# Patient Record
Sex: FEMALE | Race: WHITE | NOT HISPANIC OR LATINO | Employment: PART TIME | URBAN - METROPOLITAN AREA
[De-identification: names, ages, dates, MRNs, and addresses within clinical notes are randomized per-mention and may not be internally consistent; named-entity substitution may affect disease eponyms.]

---

## 2023-09-29 PROBLEM — R53.83 FATIGUE: Status: ACTIVE | Noted: 2023-09-29

## 2023-09-29 RX ORDER — LEVONORGESTREL 19.5 MG/1
INTRAUTERINE DEVICE INTRAUTERINE
COMMUNITY
End: 2023-10-05 | Stop reason: WASHOUT

## 2023-10-05 ENCOUNTER — HOSPITAL ENCOUNTER (OUTPATIENT)
Dept: CARDIOLOGY | Facility: CLINIC | Age: 33
Discharge: HOME | End: 2023-10-05
Payer: COMMERCIAL

## 2023-10-05 ENCOUNTER — OFFICE VISIT (OUTPATIENT)
Dept: PRIMARY CARE | Facility: CLINIC | Age: 33
End: 2023-10-05
Payer: COMMERCIAL

## 2023-10-05 VITALS
HEIGHT: 70 IN | HEART RATE: 92 BPM | BODY MASS INDEX: 30.06 KG/M2 | DIASTOLIC BLOOD PRESSURE: 90 MMHG | WEIGHT: 210 LBS | OXYGEN SATURATION: 98 % | SYSTOLIC BLOOD PRESSURE: 138 MMHG

## 2023-10-05 DIAGNOSIS — R00.2 HEART PALPITATIONS: ICD-10-CM

## 2023-10-05 DIAGNOSIS — R00.2 PALPITATION: Primary | ICD-10-CM

## 2023-10-05 DIAGNOSIS — R00.2 PALPITATION: ICD-10-CM

## 2023-10-05 PROCEDURE — 1036F TOBACCO NON-USER: CPT | Performed by: INTERNAL MEDICINE

## 2023-10-05 PROCEDURE — 99214 OFFICE O/P EST MOD 30 MIN: CPT | Performed by: INTERNAL MEDICINE

## 2023-10-05 PROCEDURE — 93000 ELECTROCARDIOGRAM COMPLETE: CPT | Performed by: INTERNAL MEDICINE

## 2023-10-05 PROCEDURE — 93244 EXT ECG>48HR<7D REV&INTERPJ: CPT | Performed by: INTERNAL MEDICINE

## 2023-10-05 PROCEDURE — 93242 EXT ECG>48HR<7D RECORDING: CPT

## 2023-10-05 NOTE — PROGRESS NOTES
"Subjective   Patient ID: [id number]     Zulema Oliveira is a 33 y.o. female who presents for Establish Care and Hypertension.  No current outpatient medications on file.  HPI  33-year-old patient presented to the office today to discuss concerns regarding ongoing palpitations.  Patient stated that for the last 2 weeks she felt palpitations at night especially when she is watching TV or laying down.  1 episode made her experience shortness of breath as well.  Typically she has no chest pain or shortness of breath or difficulty breathing.  She is dealing with significant stress in her life lately and that could be contributing factor she drinks 2 cups of coffee per day.  Review of system was reviewed all normal except what is noted in HPI   No past medical history on file.   Objective   /90 (BP Location: Right arm, Patient Position: Sitting)   Pulse 92   Ht 1.765 m (5' 9.5\")   Wt 95.3 kg (210 lb)   SpO2 98%   BMI 30.57 kg/m²      Physical Exam  Constitutional:       Appearance: Normal appearance.   Cardiovascular:      Rate and Rhythm: Normal rate and regular rhythm.      Pulses: Normal pulses.      Heart sounds: Normal heart sounds.   Pulmonary:      Effort: Pulmonary effort is normal.      Breath sounds: Normal breath sounds.   Neurological:      Mental Status: She is alert.       Assessment/Plan   Problem List Items Addressed This Visit    None  Visit Diagnoses       Heart palpitations            53-year-old patient with the following issues.    1.  Concerns regarding heart palpitations, I believe at this point we need to proceed with cardiac monitor to capture these episodes reviewed time and analyzed the cardiac rhythm.  Most likely they are premature contractions but believes that when he received them.  I will see the patient back in the office to discuss the results.  Meanwhile I instructed the patient to make sure she is hydrated and to take a deep breath and very slowly when these happen to try to " imitate them we are going to check her thyroid function and hemoglobin since she has not had any blood work recently.  Her blood pressure seems to be trending on the higher side but I believe it is all related to anxiety.  We will monitor very closely and we will treat if needed.  Patient stated understanding all her questions were addressed no other active issues or concerns.    Shamika Bob MD

## 2023-10-05 NOTE — PROGRESS NOTES
Subjective   Patient ID: Zulema Oliveira is a 33 y.o. female who presents for Establish Care and Hypertension.    HPI   Patient presents today to establish care.     Patient also states that she has been having heart palpitation and elevated blood pressure readings. Patient states that she did check her blood pressure at home and it was 148/98 and then recheck it and it was 138/80's.   Denies dizziness, but states she gets headaches just about every night.     Hypertension does run in the family     Did have preeclampsia when she had her son last June.    Review of Systems    Objective   There were no vitals taken for this visit.    Physical Exam    Assessment/Plan   Problem List Items Addressed This Visit    None

## 2023-10-06 ENCOUNTER — LAB (OUTPATIENT)
Dept: LAB | Facility: LAB | Age: 33
End: 2023-10-06
Payer: COMMERCIAL

## 2023-10-06 DIAGNOSIS — R00.2 HEART PALPITATIONS: ICD-10-CM

## 2023-10-06 LAB
ERYTHROCYTE [DISTWIDTH] IN BLOOD BY AUTOMATED COUNT: 11.9 % (ref 11.5–14.5)
HCT VFR BLD AUTO: 42.5 % (ref 36–46)
HGB BLD-MCNC: 14 G/DL (ref 12–16)
MCH RBC QN AUTO: 30.9 PG (ref 26–34)
MCHC RBC AUTO-ENTMCNC: 32.9 G/DL (ref 32–36)
MCV RBC AUTO: 94 FL (ref 80–100)
NRBC BLD-RTO: 0 /100 WBCS (ref 0–0)
PLATELET # BLD AUTO: 215 X10*3/UL (ref 150–450)
PMV BLD AUTO: 12.4 FL (ref 7.5–11.5)
RBC # BLD AUTO: 4.53 X10*6/UL (ref 4–5.2)
TSH SERPL-ACNC: 1.6 MIU/L (ref 0.44–3.98)
WBC # BLD AUTO: 5.5 X10*3/UL (ref 4.4–11.3)

## 2023-10-06 PROCEDURE — 85027 COMPLETE CBC AUTOMATED: CPT

## 2023-10-06 PROCEDURE — 36415 COLL VENOUS BLD VENIPUNCTURE: CPT

## 2023-10-06 PROCEDURE — 84443 ASSAY THYROID STIM HORMONE: CPT

## 2023-10-10 ENCOUNTER — TELEPHONE (OUTPATIENT)
Dept: PRIMARY CARE | Facility: CLINIC | Age: 33
End: 2023-10-10
Payer: COMMERCIAL

## 2023-11-02 DIAGNOSIS — I47.20 VENTRICULAR TACHYCARDIA SEEN ON CARDIAC MONITOR (MULTI): ICD-10-CM

## 2023-11-02 DIAGNOSIS — R00.2 PALPITATIONS: Primary | ICD-10-CM

## 2023-11-07 ENCOUNTER — APPOINTMENT (OUTPATIENT)
Dept: PRIMARY CARE | Facility: CLINIC | Age: 33
End: 2023-11-07
Payer: COMMERCIAL

## 2023-12-06 ENCOUNTER — APPOINTMENT (OUTPATIENT)
Dept: CARDIOLOGY | Facility: CLINIC | Age: 33
End: 2023-12-06
Payer: COMMERCIAL

## 2023-12-15 ENCOUNTER — APPOINTMENT (OUTPATIENT)
Dept: PRIMARY CARE | Facility: CLINIC | Age: 33
End: 2023-12-15
Payer: COMMERCIAL

## 2023-12-15 ENCOUNTER — OFFICE VISIT (OUTPATIENT)
Dept: PRIMARY CARE | Facility: CLINIC | Age: 33
End: 2023-12-15
Payer: COMMERCIAL

## 2023-12-15 VITALS
WEIGHT: 213.6 LBS | DIASTOLIC BLOOD PRESSURE: 80 MMHG | SYSTOLIC BLOOD PRESSURE: 122 MMHG | HEART RATE: 78 BPM | OXYGEN SATURATION: 100 % | BODY MASS INDEX: 30.58 KG/M2 | HEIGHT: 70 IN

## 2023-12-15 DIAGNOSIS — Z00.00 HEALTH CARE MAINTENANCE: Primary | ICD-10-CM

## 2023-12-15 DIAGNOSIS — R00.2 HEART PALPITATIONS: ICD-10-CM

## 2023-12-15 PROCEDURE — 99395 PREV VISIT EST AGE 18-39: CPT | Performed by: INTERNAL MEDICINE

## 2023-12-15 PROCEDURE — 1036F TOBACCO NON-USER: CPT | Performed by: INTERNAL MEDICINE

## 2023-12-15 ASSESSMENT — PATIENT HEALTH QUESTIONNAIRE - PHQ9
2. FEELING DOWN, DEPRESSED OR HOPELESS: NOT AT ALL
1. LITTLE INTEREST OR PLEASURE IN DOING THINGS: NOT AT ALL
SUM OF ALL RESPONSES TO PHQ9 QUESTIONS 1 AND 2: 0

## 2023-12-15 NOTE — PROGRESS NOTES
"Reason for Visit: Annual Physical Exam  Allergies and Medications  Patient has no known allergies.  Current Outpatient Medications   Medication Instructions    prenatal vit 75/iron/folic/om3 (ONE DAILY PRENATAL ORAL) oral     HPI:  33-year-old patient presented to the office for her annual exam.  Patient is doing really well and she told me today that she just found out she 6 weeks pregnant.  She denies any chest pain and shortness of breath not even on exertion she was recently evaluated with a cardiac monitor for palpitations she does have an appointment coming up with cardiology in 5 days.  She is not having as much palpitations as she used to.  She denies any abdominal pain nausea vomiting changes in the bowel movements or pain.  She denies urine symptoms.  Her appetite is good her energy level is adequate.    ROS otherwise negative aside from what was mentioned above in HPI.    Vitals  /80 (BP Location: Right arm, Patient Position: Sitting)   Pulse 78   Ht 1.765 m (5' 9.5\")   Wt 96.9 kg (213 lb 9.6 oz)   SpO2 100%   BMI 31.09 kg/m²   Body mass index is 31.09 kg/m².  Physical Exam  Physical Exam  Constitutional:       Appearance: Normal appearance.   Eyes:      Extraocular Movements: Extraocular movements intact.      Pupils: Pupils are equal, round, and reactive to light.   Cardiovascular:      Rate and Rhythm: Normal rate and regular rhythm.      Pulses: Normal pulses.      Heart sounds: Normal heart sounds.   Pulmonary:      Effort: Pulmonary effort is normal.      Breath sounds: Normal breath sounds.   Chest:      Comments: Breast exam was completed no masses asymmetry or discharge.  Abdominal:      General: Abdomen is flat.      Palpations: Abdomen is soft.   Neurological:      General: No focal deficit present.      Mental Status: She is alert. Mental status is at baseline.   Psychiatric:         Mood and Affect: Mood normal.         Behavior: Behavior normal.         Thought Content: Thought " content normal.         Judgment: Judgment normal.        Active Problem List    Comprehensive Medical/Surgical/Social/Family History  No past medical history on file.  Past Surgical History:   Procedure Laterality Date    OTHER SURGICAL HISTORY  07/13/2020    No history of surgery     Social History     Social History Narrative    Not on file   Patient does not smoke and does not drink alcohol she drinks 1 cup of coffee a day she works as a nurse educator part-time.  Family History   Problem Relation Name Age of Onset    Anemia Mother      Hypothyroidism Mother      Hypertension Father      Atrial fibrillation Father          by ECG    Breast cancer Paternal Grandmother      Lung cancer Paternal Grandfather        Patient has 1 brother in good health.    Assessment and Plan:  Problem List Items Addressed This Visit    None  53-year-old patient with the following issues.    1.  Palpitations status post evaluation with a cardiac monitor with results of preliminary available in the chart they have not yet been read by cardiologist patient is scheduled with cardiology in 5 days and they will go over the results.  Her rhythm is sinus and there is less than 1% burden of ventricular ectopy which I do not believe is concerning.    2.  Health maintenance issues, lab work is requested patient is up-to-date on her Pap smear.    Disposition, I will see the patient back in the office in 1 year for repeat physical and sooner if needed.

## 2023-12-18 ENCOUNTER — LAB (OUTPATIENT)
Dept: LAB | Facility: LAB | Age: 33
End: 2023-12-18
Payer: COMMERCIAL

## 2023-12-18 ENCOUNTER — INITIAL PRENATAL (OUTPATIENT)
Dept: OBSTETRICS AND GYNECOLOGY | Facility: CLINIC | Age: 33
End: 2023-12-18
Payer: COMMERCIAL

## 2023-12-18 VITALS — BODY MASS INDEX: 31.09 KG/M2 | WEIGHT: 213.63 LBS

## 2023-12-18 DIAGNOSIS — Z34.81 ENCOUNTER FOR SUPERVISION OF OTHER NORMAL PREGNANCY IN FIRST TRIMESTER (HHS-HCC): ICD-10-CM

## 2023-12-18 DIAGNOSIS — Z34.90 PRENATAL CARE, ANTEPARTUM (HHS-HCC): ICD-10-CM

## 2023-12-18 DIAGNOSIS — Z32.01 PREGNANCY TEST POSITIVE (HHS-HCC): ICD-10-CM

## 2023-12-18 DIAGNOSIS — Z87.59 HISTORY OF PRE-ECLAMPSIA: ICD-10-CM

## 2023-12-18 DIAGNOSIS — R10.9 TRIGGER POINT OF ABDOMEN: ICD-10-CM

## 2023-12-18 DIAGNOSIS — R00.2 PALPITATIONS: Primary | ICD-10-CM

## 2023-12-18 PROBLEM — Z34.91 ENCOUNTER FOR SUPERVISION OF NORMAL PREGNANCY IN FIRST TRIMESTER (HHS-HCC): Status: ACTIVE | Noted: 2023-12-18

## 2023-12-18 LAB
ABO GROUP (TYPE) IN BLOOD: NORMAL
ANTIBODY SCREEN: NORMAL
CREAT UR-MCNC: 64.2 MG/DL (ref 20–320)
ERYTHROCYTE [DISTWIDTH] IN BLOOD BY AUTOMATED COUNT: 11.7 % (ref 11.5–14.5)
HBV SURFACE AG SERPL QL IA: NONREACTIVE
HCT VFR BLD AUTO: 38.6 % (ref 36–46)
HCV AB SER QL: NONREACTIVE
HGB BLD-MCNC: 13.3 G/DL (ref 12–16)
HIV 1+2 AB+HIV1 P24 AG SERPL QL IA: NONREACTIVE
MCH RBC QN AUTO: 32.2 PG (ref 26–34)
MCHC RBC AUTO-ENTMCNC: 34.5 G/DL (ref 32–36)
MCV RBC AUTO: 94 FL (ref 80–100)
NRBC BLD-RTO: 0 /100 WBCS (ref 0–0)
PLATELET # BLD AUTO: 188 X10*3/UL (ref 150–450)
PREGNANCY TEST URINE, POC: POSITIVE
PROT UR-ACNC: 4 MG/DL (ref 5–24)
PROT/CREAT UR: 0.06 MG/MG CREAT (ref 0–0.17)
RBC # BLD AUTO: 4.13 X10*6/UL (ref 4–5.2)
REFLEX ADDED, ANEMIA PANEL: NORMAL
RH FACTOR (ANTIGEN D): NORMAL
RUBV IGG SERPL IA-ACNC: 1 IA
RUBV IGG SERPL QL IA: POSITIVE
T PALLIDUM AB SER QL: NONREACTIVE
URATE SERPL-MCNC: 5 MG/DL (ref 2.3–6.7)
WBC # BLD AUTO: 9.3 X10*3/UL (ref 4.4–11.3)

## 2023-12-18 PROCEDURE — 81025 URINE PREGNANCY TEST: CPT | Performed by: STUDENT IN AN ORGANIZED HEALTH CARE EDUCATION/TRAINING PROGRAM

## 2023-12-18 PROCEDURE — 86850 RBC ANTIBODY SCREEN: CPT

## 2023-12-18 PROCEDURE — 87086 URINE CULTURE/COLONY COUNT: CPT

## 2023-12-18 PROCEDURE — 86803 HEPATITIS C AB TEST: CPT

## 2023-12-18 PROCEDURE — 86780 TREPONEMA PALLIDUM: CPT

## 2023-12-18 PROCEDURE — 86317 IMMUNOASSAY INFECTIOUS AGENT: CPT

## 2023-12-18 PROCEDURE — 87389 HIV-1 AG W/HIV-1&-2 AB AG IA: CPT

## 2023-12-18 PROCEDURE — 84550 ASSAY OF BLOOD/URIC ACID: CPT

## 2023-12-18 PROCEDURE — 87800 DETECT AGNT MULT DNA DIREC: CPT

## 2023-12-18 PROCEDURE — 83020 HEMOGLOBIN ELECTROPHORESIS: CPT | Performed by: STUDENT IN AN ORGANIZED HEALTH CARE EDUCATION/TRAINING PROGRAM

## 2023-12-18 PROCEDURE — 36415 COLL VENOUS BLD VENIPUNCTURE: CPT

## 2023-12-18 PROCEDURE — 84156 ASSAY OF PROTEIN URINE: CPT

## 2023-12-18 PROCEDURE — 82570 ASSAY OF URINE CREATININE: CPT

## 2023-12-18 PROCEDURE — 0500F INITIAL PRENATAL CARE VISIT: CPT | Performed by: STUDENT IN AN ORGANIZED HEALTH CARE EDUCATION/TRAINING PROGRAM

## 2023-12-18 PROCEDURE — 86901 BLOOD TYPING SEROLOGIC RH(D): CPT

## 2023-12-18 PROCEDURE — 86900 BLOOD TYPING SEROLOGIC ABO: CPT

## 2023-12-18 PROCEDURE — 85027 COMPLETE CBC AUTOMATED: CPT

## 2023-12-18 PROCEDURE — 83021 HEMOGLOBIN CHROMOTOGRAPHY: CPT

## 2023-12-18 PROCEDURE — 87340 HEPATITIS B SURFACE AG IA: CPT

## 2023-12-18 ASSESSMENT — EDINBURGH POSTNATAL DEPRESSION SCALE (EPDS)
I HAVE FELT SCARED OR PANICKY FOR NO GOOD REASON: YES, SOMETIMES
I HAVE BEEN SO UNHAPPY THAT I HAVE BEEN CRYING: NO, NEVER
I HAVE LOOKED FORWARD WITH ENJOYMENT TO THINGS: AS MUCH AS I EVER DID
I HAVE BEEN SO UNHAPPY THAT I HAVE HAD DIFFICULTY SLEEPING: NOT AT ALL
I HAVE FELT SAD OR MISERABLE: NO, NOT AT ALL
THE THOUGHT OF HARMING MYSELF HAS OCCURRED TO ME: NEVER
TOTAL SCORE: 7
THINGS HAVE BEEN GETTING ON TOP OF ME: NO, MOST OF THE TIME I HAVE COPED QUITE WELL
I HAVE BLAMED MYSELF UNNECESSARILY WHEN THINGS WENT WRONG: YES, SOME OF THE TIME
I HAVE BEEN ABLE TO LAUGH AND SEE THE FUNNY SIDE OF THINGS: AS MUCH AS I ALWAYS COULD
I HAVE BEEN ANXIOUS OR WORRIED FOR NO GOOD REASON: YES, SOMETIMES

## 2023-12-19 LAB
BACTERIA UR CULT: NO GROWTH
C TRACH RRNA SPEC QL NAA+PROBE: NEGATIVE
HEMOGLOBIN A2: 3.1 % (ref 2–3.5)
HEMOGLOBIN A: 95.6 % (ref 95.8–98)
HEMOGLOBIN F: 1.3 % (ref 0–2)
HEMOGLOBIN IDENTIFICATION INTERPRETATION: NORMAL
N GONORRHOEA DNA SPEC QL PROBE+SIG AMP: NEGATIVE
PATH REVIEW-HGB IDENTIFICATION: ABNORMAL

## 2023-12-20 ENCOUNTER — OFFICE VISIT (OUTPATIENT)
Dept: CARDIOLOGY | Facility: CLINIC | Age: 33
End: 2023-12-20
Payer: COMMERCIAL

## 2023-12-20 VITALS
HEART RATE: 81 BPM | SYSTOLIC BLOOD PRESSURE: 118 MMHG | WEIGHT: 216 LBS | DIASTOLIC BLOOD PRESSURE: 79 MMHG | OXYGEN SATURATION: 98 % | HEIGHT: 70 IN | BODY MASS INDEX: 30.92 KG/M2

## 2023-12-20 DIAGNOSIS — R00.2 PALPITATIONS: Primary | ICD-10-CM

## 2023-12-20 DIAGNOSIS — R06.02 SHORTNESS OF BREATH: ICD-10-CM

## 2023-12-20 PROCEDURE — 99213 OFFICE O/P EST LOW 20 MIN: CPT | Mod: 25 | Performed by: INTERNAL MEDICINE

## 2023-12-20 PROCEDURE — 1036F TOBACCO NON-USER: CPT | Performed by: INTERNAL MEDICINE

## 2023-12-20 PROCEDURE — 93005 ELECTROCARDIOGRAM TRACING: CPT | Performed by: INTERNAL MEDICINE

## 2023-12-20 PROCEDURE — 93010 ELECTROCARDIOGRAM REPORT: CPT | Performed by: INTERNAL MEDICINE

## 2023-12-20 PROCEDURE — 99203 OFFICE O/P NEW LOW 30 MIN: CPT | Performed by: INTERNAL MEDICINE

## 2023-12-20 ASSESSMENT — COLUMBIA-SUICIDE SEVERITY RATING SCALE - C-SSRS
6. HAVE YOU EVER DONE ANYTHING, STARTED TO DO ANYTHING, OR PREPARED TO DO ANYTHING TO END YOUR LIFE?: NO
1. IN THE PAST MONTH, HAVE YOU WISHED YOU WERE DEAD OR WISHED YOU COULD GO TO SLEEP AND NOT WAKE UP?: NO
2. HAVE YOU ACTUALLY HAD ANY THOUGHTS OF KILLING YOURSELF?: NO

## 2023-12-20 ASSESSMENT — PATIENT HEALTH QUESTIONNAIRE - PHQ9
SUM OF ALL RESPONSES TO PHQ9 QUESTIONS 1 AND 2: 0
1. LITTLE INTEREST OR PLEASURE IN DOING THINGS: NOT AT ALL
2. FEELING DOWN, DEPRESSED OR HOPELESS: NOT AT ALL

## 2023-12-20 ASSESSMENT — PAIN SCALES - GENERAL: PAINLEVEL: 0-NO PAIN

## 2023-12-20 NOTE — PROGRESS NOTES
"Subjective   Zulema Oliveira is a 33 y.o. female who presents to the [facility] for evaluation of shortness of breath and palpitations.     Ms. Oliveira notes episodes of palpitations in summer and fall with feeling of \"skipped heart beat feeling\". 7 day HR monitor in October showed average HR 80 bpm (44 -158 bpm range) with rare PACs/PVCs and no abnormal tachyarrhythmia. She is now 7 weeks pregnant and has noted new shortness of breath at rest and with activity x 1 week. No active  cardiac symptoms of chest pain, PND, orthopnea, JASMYNE, syncope, or claudication.     Past Medical History:  1. Palpitations  2. Pre-eclampsia    Social History:  Nonsmoker    Family History:  No CAD in 1st degree relatives. Father has dyslipidemia and AFib.    Review of Systems    A 14 point review of systems was asked. All questions were negative except for pertinent positives listed in the HPI.     Current Outpatient Medications on File Prior to Visit   Medication Sig Dispense Refill    prenatal vit 75/iron/folic/om3 (ONE DAILY PRENATAL ORAL) Take by mouth.       No current facility-administered medications on file prior to visit.          Objective   Physical Exam  BP Readings from Last 3 Encounters:   12/20/23 118/79   12/15/23 122/80   10/05/23 138/90      Wt Readings from Last 3 Encounters:   12/20/23 98 kg (216 lb)   12/18/23 96.9 kg (213 lb 10 oz)   12/15/23 96.9 kg (213 lb 9.6 oz)      BMI: Estimated body mass index is 30.99 kg/m² as calculated from the following:    Height as of this encounter: 1.778 m (5' 10\").    Weight as of this encounter: 98 kg (216 lb).  BSA: Estimated body surface area is 2.2 meters squared as calculated from the following:    Height as of this encounter: 1.778 m (5' 10\").    Weight as of this encounter: 98 kg (216 lb).    General: no acute distress  HEENT: EOMI, no scleral icterus.  Lungs: Clear to auscultation bilaterally without wheezing, rales, or rhonchi.  Cardiovascular: " "Regular rhythm and rate. Normal S1 and S2. No murmurs, rubs, or gallops are appreciated. JVP normal.  Abdomen: Soft, nontender, nondistended. Bowel sounds present.  Extremities: Warm and well perfused with equal 2+ pulses bilaterally.  No edema present.  Neurologic: Alert and oriented x3.    I have personally reviewed the following images and laboratory findings:  Last echocardiogram: n/a    Last cath / stress test: n/a    Most recent EC2023 ECG: Sinus rhythm with sinus arrhythmia, 73 bpm. Normal ECG.    10/5/2023 7 day heart rate monitor: Average HR 80 bpm (range 44 - 158 bpm), rare PAC/PVCs, no abnormal tachyarrhythmia, no AFib/AFlutter     Lab Results   Component Value Date    CHOL 118 2023    CHOL 124 2020     Lab Results   Component Value Date    HDL 52.5 2023    HDL 57.4 2020     Lab Results   Component Value Date    LDLCALC 54 2023     Lab Results   Component Value Date    TRIG 58 2023    TRIG 55 2020     No components found for: \"CHOLHDL\"      Assessment/Plan   1. Dyspnea:  Check echocardiogram. History of pre-eclampsia with 1st pregnancy and rare PVCs on 2023 heart rate monitor.    2. Palpitations:  Trigger avoidance. Symptomatology can be exacerbated in 2nd/3rd trimeter of pregnancy. Maintain oral hydration. No abnormal heart rhythms on 2023 7 day heart rate monitor. Would not advise use of AV stella medications to suppress ectopic beats as she is currently pregnant.           SIGNATURE: Kennedy Rios MD PATIENT NAME: Zulema Oliveira   DATE/TIME: 2023 4:32 PM MRN: [id number]     "

## 2023-12-20 NOTE — PATIENT INSTRUCTIONS
You were seen at the [facility] for a check up of your heart.    We reviewed your October 2023 heart rate monitor. Average heart rate 80 beats per minute with normal heart rate variability (range 44 -158 bpm). You had rare ectopic contractions (extra heart beats) from the top (PACs) and bottom (PVCs) of your heart. No abnormal fasting heart rhythms were seen.    Your ECG today is normal. Your heart exam today is normal.    For your new shortness of breath with history of pre-eclampsia, I am ordering an echocardiogram (ultrasound of the heart) to look at your heart's structure and function.

## 2023-12-21 ENCOUNTER — LAB (OUTPATIENT)
Dept: LAB | Facility: LAB | Age: 33
End: 2023-12-21
Payer: COMMERCIAL

## 2023-12-21 DIAGNOSIS — Z00.00 HEALTH CARE MAINTENANCE: ICD-10-CM

## 2023-12-21 LAB
ALBUMIN SERPL BCP-MCNC: 4.1 G/DL (ref 3.4–5)
ALP SERPL-CCNC: 17 U/L (ref 33–110)
ALT SERPL W P-5'-P-CCNC: 11 U/L (ref 7–45)
ANION GAP SERPL CALC-SCNC: 13 MMOL/L (ref 10–20)
AST SERPL W P-5'-P-CCNC: 12 U/L (ref 9–39)
ATRIAL RATE: 73 BPM
BILIRUB SERPL-MCNC: 0.5 MG/DL (ref 0–1.2)
BUN SERPL-MCNC: 11 MG/DL (ref 6–23)
CALCIUM SERPL-MCNC: 9 MG/DL (ref 8.6–10.6)
CHLORIDE SERPL-SCNC: 108 MMOL/L (ref 98–107)
CHOLEST SERPL-MCNC: 118 MG/DL (ref 0–199)
CHOLESTEROL/HDL RATIO: 2.2
CO2 SERPL-SCNC: 23 MMOL/L (ref 21–32)
CREAT SERPL-MCNC: 0.53 MG/DL (ref 0.5–1.05)
ERYTHROCYTE [DISTWIDTH] IN BLOOD BY AUTOMATED COUNT: 11.9 % (ref 11.5–14.5)
GFR SERPL CREATININE-BSD FRML MDRD: >90 ML/MIN/1.73M*2
GLUCOSE SERPL-MCNC: 85 MG/DL (ref 74–99)
HCT VFR BLD AUTO: 38 % (ref 36–46)
HDLC SERPL-MCNC: 52.5 MG/DL
HGB BLD-MCNC: 13.1 G/DL (ref 12–16)
LDLC SERPL CALC-MCNC: 54 MG/DL
MCH RBC QN AUTO: 32.2 PG (ref 26–34)
MCHC RBC AUTO-ENTMCNC: 34.5 G/DL (ref 32–36)
MCV RBC AUTO: 93 FL (ref 80–100)
NON HDL CHOLESTEROL: 66 MG/DL (ref 0–149)
NRBC BLD-RTO: 0 /100 WBCS (ref 0–0)
P AXIS: -9 DEGREES
P OFFSET: 203 MS
P ONSET: 150 MS
PLATELET # BLD AUTO: 180 X10*3/UL (ref 150–450)
POTASSIUM SERPL-SCNC: 4 MMOL/L (ref 3.5–5.3)
PR INTERVAL: 140 MS
PROT SERPL-MCNC: 6.5 G/DL (ref 6.4–8.2)
Q ONSET: 220 MS
QRS COUNT: 12 BEATS
QRS DURATION: 92 MS
QT INTERVAL: 404 MS
QTC CALCULATION(BAZETT): 445 MS
QTC FREDERICIA: 431 MS
R AXIS: 16 DEGREES
RBC # BLD AUTO: 4.07 X10*6/UL (ref 4–5.2)
SODIUM SERPL-SCNC: 140 MMOL/L (ref 136–145)
T AXIS: 24 DEGREES
T OFFSET: 422 MS
TRIGL SERPL-MCNC: 58 MG/DL (ref 0–149)
TSH SERPL-ACNC: 2.2 MIU/L (ref 0.44–3.98)
VENTRICULAR RATE: 73 BPM
VLDL: 12 MG/DL (ref 0–40)
WBC # BLD AUTO: 6.5 X10*3/UL (ref 4.4–11.3)

## 2023-12-21 PROCEDURE — 84443 ASSAY THYROID STIM HORMONE: CPT

## 2023-12-21 PROCEDURE — 80061 LIPID PANEL: CPT

## 2023-12-21 PROCEDURE — 80053 COMPREHEN METABOLIC PANEL: CPT

## 2023-12-21 PROCEDURE — 36415 COLL VENOUS BLD VENIPUNCTURE: CPT

## 2023-12-21 PROCEDURE — 85027 COMPLETE CBC AUTOMATED: CPT

## 2024-01-06 LAB — BODY SURFACE AREA: 2.16 M2

## 2024-01-09 ENCOUNTER — TELEPHONE (OUTPATIENT)
Dept: PRIMARY CARE | Facility: CLINIC | Age: 34
End: 2024-01-09
Payer: COMMERCIAL

## 2024-01-09 NOTE — TELEPHONE ENCOUNTER
Patient received a message yesterday, that Cardiology results are in. Please call patient back to advise of results.

## 2024-01-15 ENCOUNTER — ROUTINE PRENATAL (OUTPATIENT)
Dept: OBSTETRICS AND GYNECOLOGY | Facility: CLINIC | Age: 34
End: 2024-01-15
Payer: COMMERCIAL

## 2024-01-15 VITALS — WEIGHT: 217 LBS | DIASTOLIC BLOOD PRESSURE: 66 MMHG | BODY MASS INDEX: 31.14 KG/M2 | SYSTOLIC BLOOD PRESSURE: 104 MMHG

## 2024-01-15 DIAGNOSIS — O21.9 NAUSEA AND VOMITING IN PREGNANCY PRIOR TO 22 WEEKS GESTATION (HHS-HCC): ICD-10-CM

## 2024-01-15 DIAGNOSIS — R00.2 PALPITATIONS: ICD-10-CM

## 2024-01-15 DIAGNOSIS — Z87.59 HISTORY OF PRE-ECLAMPSIA: ICD-10-CM

## 2024-01-15 DIAGNOSIS — Z34.81 ENCOUNTER FOR SUPERVISION OF OTHER NORMAL PREGNANCY IN FIRST TRIMESTER (HHS-HCC): Primary | ICD-10-CM

## 2024-01-15 PROCEDURE — 0501F PRENATAL FLOW SHEET: CPT | Performed by: STUDENT IN AN ORGANIZED HEALTH CARE EDUCATION/TRAINING PROGRAM

## 2024-01-15 RX ORDER — PROMETHAZINE HYDROCHLORIDE 12.5 MG/1
12.5 TABLET ORAL EVERY 4 HOURS
Qty: 180 TABLET | Refills: 1 | Status: SHIPPED | OUTPATIENT
Start: 2024-01-15 | End: 2024-04-14

## 2024-01-15 RX ORDER — PYRIDOXINE HCL (VITAMIN B6) 25 MG
25 TABLET ORAL DAILY
Qty: 60 TABLET | Refills: 3 | Status: SHIPPED | OUTPATIENT
Start: 2024-01-15 | End: 2024-04-14

## 2024-01-15 RX ORDER — ASPIRIN 81 MG/1
162 TABLET ORAL DAILY
Qty: 180 TABLET | Refills: 3 | Status: SHIPPED | OUTPATIENT
Start: 2024-01-15 | End: 2025-01-14

## 2024-01-15 NOTE — ASSESSMENT & PLAN NOTE
- Reviewed cardiology note -avoid triggers, recommend TTE in setting of dyspnea - scheduled  - Follow results

## 2024-01-15 NOTE — ASSESSMENT & PLAN NOTE
- Reviewed all labs from prior visit  - Rx sent for aspirin 162mg to begin at 12 weeks  - Decline cffDNA at this time - reviewed that it can be drawn after US  - NT scheduled 2/5  - Desires lactation referral prior to delivery - order placed today and can schedule when desired

## 2024-01-15 NOTE — PROGRESS NOTES
Subjective   Patient ID [id number]   Zulema Oliveira is a 33 y.o.  at 10w6d with a working estimated date of delivery of 2024, by Last Menstrual Period who presents for a routine prenatal visit. She denies vaginal bleeding, leakage of fluid, decreased fetal movements, and contractions.    Worsening nausea and vomiting, not taking any meds. Concerned about feeling larger than last pregnancy - reviewed that pregnancies can carry differently.    Objective   Physical Exam  Weight: 98.4 kg (217 lb), Pregravid BMI: 28.70  Expected Total Weight Gain: 7 kg (15 lb)-11.5 kg (25 lb)   BP: 104/66  Fetal Heart Rate: US Present      Prenatal Labs  Reviewed all in record    Assessment/Plan   Problem List Items Addressed This Visit             ICD-10-CM       Pregnancy    Encounter for supervision of normal pregnancy in first trimester - Primary Z34.91     - Reviewed all labs from prior visit  - Rx sent for aspirin 162mg to begin at 12 weeks  - Decline cffDNA at this time - reviewed that it can be drawn after US  - NT scheduled 2/5  - Desires lactation referral prior to delivery - order placed today and can schedule when desired         Relevant Medications    aspirin 81 mg EC tablet    Other Relevant Orders    Referral to Lactation    Nausea and vomiting in pregnancy prior to 22 weeks gestation O21.9     Rx for B6/doxylamine  Rx for phenergan         Relevant Medications    pyridoxine (Vitamin B-6) 25 mg tablet    doxylamine (Unisom) 25 mg tablet    promethazine (Phenergan) 12.5 mg tablet       Other    Palpitations R00.2     - Reviewed cardiology note -avoid triggers, recommend TTE in setting of dyspnea - scheduled  - Follow results         History of pre-eclampsia Z87.59     Rx baby aspirin  Baseline labs wnl            Follow up in 4 weeks for a routine prenatal visit.

## 2024-01-16 ENCOUNTER — HOSPITAL ENCOUNTER (OUTPATIENT)
Dept: CARDIOLOGY | Facility: CLINIC | Age: 34
Discharge: HOME | End: 2024-01-16
Payer: COMMERCIAL

## 2024-01-16 VITALS — SYSTOLIC BLOOD PRESSURE: 127 MMHG | DIASTOLIC BLOOD PRESSURE: 76 MMHG

## 2024-01-16 DIAGNOSIS — R06.02 SHORTNESS OF BREATH: ICD-10-CM

## 2024-01-16 LAB
AORTIC VALVE PEAK VELOCITY: 1.21
AV PEAK GRADIENT: 5.8
AVA (PEAK VEL): 3.47
EJECTION FRACTION APICAL 4 CHAMBER: 60.4
EJECTION FRACTION: 56
LEFT ATRIUM VOLUME AREA LENGTH INDEX BSA: 23.5
LEFT VENTRICLE INTERNAL DIMENSION DIASTOLE: 4.57 (ref 3.5–6)
LEFT VENTRICULAR OUTFLOW TRACT DIAMETER: 2.06
MITRAL VALVE E/A RATIO: 1.55
MITRAL VALVE E/E' RATIO: 8.27
RIGHT VENTRICLE FREE WALL PEAK S': 12
RIGHT VENTRICLE PEAK SYSTOLIC PRESSURE: 17.5
TRICUSPID ANNULAR PLANE SYSTOLIC EXCURSION: 2.4

## 2024-01-16 PROCEDURE — 93306 TTE W/DOPPLER COMPLETE: CPT

## 2024-01-16 PROCEDURE — 93306 TTE W/DOPPLER COMPLETE: CPT | Performed by: INTERNAL MEDICINE

## 2024-01-25 ENCOUNTER — TELEPHONE (OUTPATIENT)
Dept: CARDIOLOGY | Facility: CLINIC | Age: 34
End: 2024-01-25
Payer: COMMERCIAL

## 2024-01-25 NOTE — TELEPHONE ENCOUNTER
Dr. Rios request nursing contact patient and provide results of echo performed 1/16/24 and plan of care of echo with normal result, awaiting result of heart monitor.  Phoned patient and provided results and plan of care as above, patient verbalized understanding.

## 2024-02-05 ENCOUNTER — HOSPITAL ENCOUNTER (OUTPATIENT)
Dept: RADIOLOGY | Facility: CLINIC | Age: 34
Discharge: HOME | End: 2024-02-05
Payer: COMMERCIAL

## 2024-02-05 DIAGNOSIS — Z34.90 PRENATAL CARE, ANTEPARTUM (HHS-HCC): ICD-10-CM

## 2024-02-05 DIAGNOSIS — O99.211 OBESITY COMPLICATING PREGNANCY, FIRST TRIMESTER (HHS-HCC): ICD-10-CM

## 2024-02-05 PROCEDURE — 76813 OB US NUCHAL MEAS 1 GEST: CPT | Performed by: OBSTETRICS & GYNECOLOGY

## 2024-02-05 PROCEDURE — 76815 OB US LIMITED FETUS(S): CPT | Performed by: OBSTETRICS & GYNECOLOGY

## 2024-02-05 PROCEDURE — 76813 OB US NUCHAL MEAS 1 GEST: CPT

## 2024-02-05 PROCEDURE — 76801 OB US < 14 WKS SINGLE FETUS: CPT

## 2024-02-07 ENCOUNTER — TELEPHONE (OUTPATIENT)
Dept: CARDIOLOGY | Facility: CLINIC | Age: 34
End: 2024-02-07
Payer: COMMERCIAL

## 2024-02-07 NOTE — TELEPHONE ENCOUNTER
Called patient and let her know echo results normal. If anything else is needed fell free to call [phone].

## 2024-02-13 ENCOUNTER — ROUTINE PRENATAL (OUTPATIENT)
Dept: OBSTETRICS AND GYNECOLOGY | Facility: CLINIC | Age: 34
End: 2024-02-13
Payer: COMMERCIAL

## 2024-02-13 VITALS — BODY MASS INDEX: 32 KG/M2 | SYSTOLIC BLOOD PRESSURE: 132 MMHG | WEIGHT: 223 LBS | DIASTOLIC BLOOD PRESSURE: 80 MMHG

## 2024-02-13 DIAGNOSIS — R10.9 TRIGGER POINT OF ABDOMEN: ICD-10-CM

## 2024-02-13 DIAGNOSIS — Z3A.15: ICD-10-CM

## 2024-02-13 DIAGNOSIS — F41.9 ANXIETY: Primary | ICD-10-CM

## 2024-02-13 DIAGNOSIS — Z34.81 ENCOUNTER FOR SUPERVISION OF OTHER NORMAL PREGNANCY IN FIRST TRIMESTER (HHS-HCC): ICD-10-CM

## 2024-02-13 DIAGNOSIS — Z87.59 HISTORY OF PRE-ECLAMPSIA: ICD-10-CM

## 2024-02-13 PROCEDURE — 0501F PRENATAL FLOW SHEET: CPT

## 2024-02-19 NOTE — PROGRESS NOTES
Assessment/Plan   Diagnoses and all orders for this visit:  Anxiety  -     Referral to Psychology; Future  Trigger point of abdomen  Encounter for supervision of other normal pregnancy in first trimester  History of pre-eclampsia  Pregnancy with 15 completed weeks gestation      Anatomy US scheduled    Reviewed s/sx of PTL, warning signs, fetal movement counts, and when to call provider  Follow up in 4 weeks for a routine prenatal visit.    Rebekah Bose, APRN-FLAQUITAM    Subjective     Zulema Oliveira is a 33 y.o.  at 15w6d with a working estimated date of delivery of 2024, by Last Menstrual Period who presents for a routine prenatal visit. She denies vaginal bleeding, leakage of fluid, decreased fetal movements, or contractions.    Pt continues to struggle with NV.  She is feeling anxiety/depressed and discouraged. Does not desire med mgt at this time but would like to establish care with psychiatry. Sonidoly discussed  IOP, which she is not interested in at this time. Psychiatry referral placed at this time and will reach out to Marylu Bates to help coordinate scheduling.     Her pregnancy is complicated by:  Problem List Items Addressed This Visit       Encounter for supervision of normal pregnancy in first trimester    Overview     [x] Initial BMI: 31  [x] Dating: LMP  [x] Prenatal Labs: wnl  [x] Genetic Screening:  defer at this time  [x] Baby ASA: h/o PreE - start at 12w - Rx sent  [x] Anatomy US: scheduled 3/12  [] 1hr GCT at 24-28wks:  [] Tdap (27-36wks):  [x] Flu Shot: s/p 10/1/23  [x] COVID vaccine: declines  [] Rhogam (if Rh neg):   [] GBS at 36 wks:  [x] Feeding: breast - desires referral to lactation later in pregnancy after difficulties in last  [x] Postpartum Birth control method: considering IUD  [] 39 weeks discussion of IOL vs. Expectant management:  [] Mode of delivery:          History of pre-eclampsia    Overview     Induced at 38 weeks         Trigger point of abdomen    Overview      History of trigger points after last delivery   Requests referral to PT around 30 weeks to minimize risk          Other Visit Diagnoses       Anxiety    -  Primary    Relevant Orders    Referral to Psychology             Objective   Physical Exam  Weight: 101 kg (223 lb)  TWG: 10.4 kg (23 lb)  Expected Total Weight Gain: 7 kg (15 lb)-11.5 kg (25 lb)   Pregravid BMI: 28.70  BP: 132/80  Fetal Heart Rate: 150      Postpartum Depression: Medium Risk (2023)    [place]  Depression Scale     Last EPDS Total Score: 7     Last EPDS Self Harm Result: Never       Prenatal Labs  Lab Results   Component Value Date    HGB 13.1 2023    HCT 38.0 2023     2023    ABO B 2023    LABRH POS 2023    NEISSGONOAMP Negative 2023    CHLAMTRACAMP Negative 2023    SYPHT Nonreactive 2023    HEPBSAG Nonreactive 2023    HIV1X2 Nonreactive 2023    URINECULTURE No growth 2023       Imaging  The most recent ultrasound was performed on   with a study GA of   and EFW of  .

## 2024-03-05 ENCOUNTER — TELEMEDICINE (OUTPATIENT)
Dept: BEHAVIORAL HEALTH | Facility: CLINIC | Age: 34
End: 2024-03-05
Payer: COMMERCIAL

## 2024-03-05 DIAGNOSIS — F41.8 OTHER SPECIFIED ANXIETY DISORDERS: Primary | ICD-10-CM

## 2024-03-05 DIAGNOSIS — F32.A DEPRESSION DURING PREGNANCY IN SECOND TRIMESTER (HHS-HCC): ICD-10-CM

## 2024-03-05 DIAGNOSIS — O99.342 DEPRESSION DURING PREGNANCY IN SECOND TRIMESTER (HHS-HCC): ICD-10-CM

## 2024-03-05 PROCEDURE — 90791 PSYCH DIAGNOSTIC EVALUATION: CPT | Performed by: PSYCHOLOGIST

## 2024-03-05 NOTE — PROGRESS NOTES
Outpatient Psychology Initial Evaluation    IDENTIFYING AND REFERRAL INFORMATION:  Zulema Oliveira is a 33 y.o. able-bodied employed,  White female parent of one who is currently pregnant. She was referred by ELISABETH Batista.     Start Time: 1 pm  End Time: 2 pm  Time Spent with Patient: 50 minutes    Session number 1 with this psychologist.    This is a virtual video visit.    Telephone/Televideo Informed Consent for Psychotherapy was reviewed with the patient as follows:  There are potential benefits and risks of the use of telephone or video-conferencing that differ from in-person sessions. Specifically, the telephone or televideo system we are using may not be HIPPA compliant and may present limits to patient confidentiality. Confidentiality still applies for telepsychology services, and nobody will record the session without your permission. You agree to use the telephone or video-conferencing platform selected for our virtual sessions, and I will explain how to use it.    1)             You need to use a webcam or smartphone during the session.  2)             It is important that you be in a quiet, private space that is free of distractions (including cell phone or other devices) during the session.  3)             It is important to use a secure internet connection rather than public/free Wi-Fi.  4)             It is important to be on time. If you need to cancel or change your tele-appointment, you must notify the psychologist in advance by phone or email.  5)             We need a back-up plan (e.g., phone number where you can be reached) to restart the session or to reschedule it, in the event of technical problems.  6)             We need a safety plan that includes at least one emergency contact and the closest emergency room to your location, in the event of a crisis situation.  7)             If you are not an adult, we need the permission of your parent or legal guardian (and their contact  "information) for you to participate in telepsychology sessions.    Understanding and verbal agreement was attested to by the patient.    The purpose of the meeting is for provider to understand patient's presenting problems, mental health hx, and other background information to assist with treatment planning. Patient stated an understanding of the information and agreed to the interview.    SECURE NOTE:  This document may not be released or reproduced in any form without the consent of either the Provider, the Provider´s  or the Chair of the Department of Psychiatry. This prohibition includes copying the document into any non-Restricted area of the Ambulatory Electronic Medical Record.      REASON  FOR REFERRAL:  After first pregnancy, had some postpartum anxiety symptoms. \"Had those not good enough feelings.\" Tired, so resting and sleeping more.  taking on more     SOCIAL AND FAMILY HISTORY:  , has a one year old son. Currently pregnant with second pregnancy.  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 Schedule is flexible; works part time. Previously did not care for her position, was not a good fit. Close with her parents and brother; her mother is their childcare for her son. Couple groups of friends- coworker/nurse friends, HS friends, college friends. Active in community going to festivals in their town. Does girls nights, play dates, lunches enough for her preferences. Used to enjoy yoga and exercise before having a kid; now fatigue and busy schedule get in the way.     CULTURAL CONSIDERATIONS:  No Sabianist, active in their local community engaged in family events and festivals. Several friend groups.     HISTORY OF MENTAL HEALTH SYMPTOMS AND TREATMENT:  Previous work and relationship stressors lead to seeking therapy, which turned out not to be a good fit. Only attended briefly then stopped " appointments. Reported history of postpartum depression that was not treated; fears of being inadequate parent, fears about his health/safety for about a month. Did not seek treatment due to stigma, embarrassment about how she was feeling.     She described her symptoms as anxiety, which appears to be the more fconsistent experience. She also described symptoms of depression today, which emerged recently as her self-criticism and rumination became more frequent. Currently has thoughts of being inadequate or a burden. Crying more easily than usual, low mood some days in association with feeling inadequate or not a participant in the family or with her son. Fatigue, resting more which she attributes to pregnancy. More difficulty falling asleep due to rumination, feeling uneasy/jittery/like she could crawl out of her skin (hard to describe because is a physical sensation). Intermittent guilty feelings,  noticing more irritability but she had not noticed it.     SUBSTANCE USE:   One cup of coffee/day.   Social drinking, a few times a month socially. No alcohol use when pregnant.   No tobacco, cannabis, illicit drug use.     RELEVANT MED Hx AND MEDICATIONS:  No psychiatric medication; prefers not to do medication during pregnancy if possible. Different course of pregnancy than first one; more sick, fatigued, feeling less movement of baby this time.     OBJECTIVE:  Mental Status:  Orientation and consciousness: [x ]oriented X 3 and attentive                                   [ ]other, explain:       Appearance/grooming (factors observable via video):            [x ]appropriate  [ ]poor grooming/hygiene bizarre appearance            [ ]other, explain:        Behavior: [x ]appropriate to context                 [ ]inappropriate and/or bizarre, explain:        Speech: [x]normal rate/rhythm   [ ]pressured speech   []slow                [ ]other,        Language: [x ]normal                  [ ]abnormalities noted,  explain:        Mood: [ ]euthymic [ x]depressed [ ]dysphoric [ ]anxious      [ ]euphoric  [ ] other       Affect: [x]mood congruent      []restricted               []incongruent, explain:        Evidence of perceptual disturbances (hallucinations, illusions, etc.):                [ x]no                [ ]yes, explain:       Thought processes:                     [x ]normal and congruent                     [ ]other, explain:        Insight: [ ]good  [x ]adequate [ ]poor []questionable       Judgment: [ ]good  [x ]adequate [ ]poor []questionable       Fund of Knowledge:[ ]above average  [x ]average  [ ]below average    Suicidal/Homicidal assessment: No lethality issues noted or observed. Patient denied suicidal or homicidal ideation, intent, or plan. Low risk for harm at this time.       SOCIAL DETERMINANTS OF HEALTH:  None noted.      INITIAL IMPRESSIONS:  Zulema Oliveira presented today via video telehealth for a psychosocial assessment. She reported history of anxiety for several years and experience of possibly mild postpartum depression after giving birth to her son. They lasted for about a month and she did not seek treatment. She is in the second trimester of her second pregnancy and has experienced increased anxiety symptoms (rumination, anxiety about health of pregnancy, worries about overburdening ) leading to emergence of intermittent depressive symptoms (low mood, crying easily, low energy, low motivation, guilt, insomnia). She chose to seek treatment with this pregnancy since her symptoms emerged earlier than with her first pregnancy, in context of different stressors and more responsibilities. She sought mental health therapy once in the past, with little benefit. She is hoping for a better experience this time. She is interested in understanding more about her response patterns and how to change them to be more self-supportive and helpful in her current lifestyle. She wants a space to process how  she is feeling, make plans for practicing changes, and to build new skills. She agreed to follow-up with this psychologist for individual psychotherapy.     Diagnosis: depressive disorder, unspecified, during pregnancy  Anxiety disorder, unspecified, R/O ERINN   History of postpartum depression    Plan:   rtc in about 1-2 weeks for individual psychotherapy with this psychologist via video telehealth. Patient agreed to appointment frequency and plan. Patient has scheduling contact info to use as needed. scheduling provider alerted.       Samir Arrieta, PhD

## 2024-03-11 ENCOUNTER — ROUTINE PRENATAL (OUTPATIENT)
Dept: OBSTETRICS AND GYNECOLOGY | Facility: CLINIC | Age: 34
End: 2024-03-11
Payer: COMMERCIAL

## 2024-03-11 VITALS — WEIGHT: 230 LBS | DIASTOLIC BLOOD PRESSURE: 58 MMHG | BODY MASS INDEX: 33 KG/M2 | SYSTOLIC BLOOD PRESSURE: 104 MMHG

## 2024-03-11 DIAGNOSIS — Z87.59 HISTORY OF PRE-ECLAMPSIA: ICD-10-CM

## 2024-03-11 DIAGNOSIS — O99.342 DEPRESSION DURING PREGNANCY IN SECOND TRIMESTER (HHS-HCC): ICD-10-CM

## 2024-03-11 DIAGNOSIS — O21.9 NAUSEA AND VOMITING IN PREGNANCY PRIOR TO 22 WEEKS GESTATION (HHS-HCC): ICD-10-CM

## 2024-03-11 DIAGNOSIS — F32.A DEPRESSION DURING PREGNANCY IN SECOND TRIMESTER (HHS-HCC): ICD-10-CM

## 2024-03-11 DIAGNOSIS — Z34.81 ENCOUNTER FOR SUPERVISION OF OTHER NORMAL PREGNANCY IN FIRST TRIMESTER (HHS-HCC): Primary | ICD-10-CM

## 2024-03-11 DIAGNOSIS — R00.2 PALPITATIONS: ICD-10-CM

## 2024-03-11 PROCEDURE — 0501F PRENATAL FLOW SHEET: CPT | Performed by: STUDENT IN AN ORGANIZED HEALTH CARE EDUCATION/TRAINING PROGRAM

## 2024-03-11 NOTE — PROGRESS NOTES
Subjective   Patient ID [id number]   Zulema Oliveira is a 33 y.o.  at 18w6d with a working estimated date of delivery of 2024, by Last Menstrual Period who presents for a routine prenatal visit. She denies vaginal bleeding, leakage of fluid, decreased fetal movements, or contractions.        Objective   Physical Exam  Weight: 104 kg (230 lb)  Expected Total Weight Gain: 7 kg (15 lb)-11.5 kg (25 lb)   Pregravid BMI: 28.70  BP: 104/58  Fetal Heart Rate: 144        Assessment/Plan   Problem List Items Addressed This Visit             ICD-10-CM       Pregnancy    Encounter for supervision of normal pregnancy in first trimester - Primary Z34.91    Nausea and vomiting in pregnancy prior to 22 weeks gestation O21.9     Currently resolved no meds x 2 weeks         Depression during pregnancy in second trimester O99.342, F32.A     Following with Behavioral health            Other    Palpitations R00.2     TTE results reviewed - normal, EF 55-60%, no further work up         History of pre-eclampsia Z87.59     Continue [id number]  Taking BP at home, normotensive  To bring cuff to next visit to calibrate

## 2024-03-12 ENCOUNTER — HOSPITAL ENCOUNTER (OUTPATIENT)
Dept: RADIOLOGY | Facility: CLINIC | Age: 34
Discharge: HOME | End: 2024-03-12
Payer: COMMERCIAL

## 2024-03-12 DIAGNOSIS — Z34.90 PRENATAL CARE, ANTEPARTUM (HHS-HCC): ICD-10-CM

## 2024-03-12 PROCEDURE — 76811 OB US DETAILED SNGL FETUS: CPT | Performed by: MEDICAL GENETICS

## 2024-03-12 PROCEDURE — 76811 OB US DETAILED SNGL FETUS: CPT

## 2024-03-13 ENCOUNTER — HOSPITAL ENCOUNTER (OUTPATIENT)
Facility: HOSPITAL | Age: 34
Discharge: HOME | End: 2024-03-13
Attending: STUDENT IN AN ORGANIZED HEALTH CARE EDUCATION/TRAINING PROGRAM | Admitting: OBSTETRICS & GYNECOLOGY
Payer: COMMERCIAL

## 2024-03-13 ENCOUNTER — TELEPHONE (OUTPATIENT)
Dept: OBSTETRICS AND GYNECOLOGY | Facility: CLINIC | Age: 34
End: 2024-03-13
Payer: COMMERCIAL

## 2024-03-13 ENCOUNTER — HOSPITAL ENCOUNTER (OUTPATIENT)
Facility: HOSPITAL | Age: 34
End: 2024-03-13
Attending: OBSTETRICS & GYNECOLOGY | Admitting: OBSTETRICS & GYNECOLOGY
Payer: COMMERCIAL

## 2024-03-13 VITALS
SYSTOLIC BLOOD PRESSURE: 134 MMHG | OXYGEN SATURATION: 99 % | WEIGHT: 226.41 LBS | TEMPERATURE: 99 F | HEART RATE: 88 BPM | BODY MASS INDEX: 32.41 KG/M2 | DIASTOLIC BLOOD PRESSURE: 87 MMHG | RESPIRATION RATE: 18 BRPM | HEIGHT: 70 IN

## 2024-03-13 PROCEDURE — 99214 OFFICE O/P EST MOD 30 MIN: CPT

## 2024-03-13 PROCEDURE — 99213 OFFICE O/P EST LOW 20 MIN: CPT | Performed by: FAMILY MEDICINE

## 2024-03-13 SDOH — HEALTH STABILITY: MENTAL HEALTH: HAVE YOU USED ANY PRESCRIPTION DRUGS OTHER THAN PRESCRIBED IN THE PAST 12 MONTHS?: NO

## 2024-03-13 SDOH — HEALTH STABILITY: MENTAL HEALTH: WERE YOU ABLE TO COMPLETE ALL THE BEHAVIORAL HEALTH SCREENINGS?: YES

## 2024-03-13 SDOH — SOCIAL STABILITY: SOCIAL INSECURITY: ARE YOU OR HAVE YOU BEEN THREATENED OR ABUSED PHYSICALLY, EMOTIONALLY, OR SEXUALLY BY ANYONE?: NO

## 2024-03-13 SDOH — SOCIAL STABILITY: SOCIAL INSECURITY: PHYSICAL ABUSE: DENIES

## 2024-03-13 SDOH — HEALTH STABILITY: MENTAL HEALTH: NON-SPECIFIC ACTIVE SUICIDAL THOUGHTS (PAST 1 MONTH): NO

## 2024-03-13 SDOH — SOCIAL STABILITY: SOCIAL INSECURITY: DO YOU FEEL ANYONE HAS EXPLOITED OR TAKEN ADVANTAGE OF YOU FINANCIALLY OR OF YOUR PERSONAL PROPERTY?: NO

## 2024-03-13 SDOH — ECONOMIC STABILITY: HOUSING INSECURITY: DO YOU FEEL UNSAFE GOING BACK TO THE PLACE WHERE YOU ARE LIVING?: NO

## 2024-03-13 SDOH — SOCIAL STABILITY: SOCIAL INSECURITY: HAS ANYONE EVER THREATENED TO HURT YOUR FAMILY OR YOUR PETS?: NO

## 2024-03-13 SDOH — HEALTH STABILITY: MENTAL HEALTH: WISH TO BE DEAD (PAST 1 MONTH): NO

## 2024-03-13 SDOH — SOCIAL STABILITY: SOCIAL INSECURITY: HAVE YOU HAD THOUGHTS OF HARMING ANYONE ELSE?: NO

## 2024-03-13 SDOH — HEALTH STABILITY: MENTAL HEALTH: HAVE YOU USED ANY SUBSTANCES (CANABIS, COCAINE, HEROIN, HALLUCINOGENS, INHALANTS, ETC.) IN THE PAST 12 MONTHS?: NO

## 2024-03-13 SDOH — HEALTH STABILITY: MENTAL HEALTH: SUICIDAL BEHAVIOR (LIFETIME): NO

## 2024-03-13 SDOH — SOCIAL STABILITY: SOCIAL INSECURITY: ARE THERE ANY APPARENT SIGNS OF INJURIES/BEHAVIORS THAT COULD BE RELATED TO ABUSE/NEGLECT?: NO

## 2024-03-13 SDOH — SOCIAL STABILITY: SOCIAL INSECURITY: DOES ANYONE TRY TO KEEP YOU FROM HAVING/CONTACTING OTHER FRIENDS OR DOING THINGS OUTSIDE YOUR HOME?: NO

## 2024-03-13 SDOH — SOCIAL STABILITY: SOCIAL INSECURITY: ABUSE SCREEN: ADULT

## 2024-03-13 SDOH — SOCIAL STABILITY: SOCIAL INSECURITY: VERBAL ABUSE: DENIES

## 2024-03-13 ASSESSMENT — PATIENT HEALTH QUESTIONNAIRE - PHQ9
2. FEELING DOWN, DEPRESSED OR HOPELESS: NOT AT ALL
SUM OF ALL RESPONSES TO PHQ9 QUESTIONS 1 & 2: 0
1. LITTLE INTEREST OR PLEASURE IN DOING THINGS: NOT AT ALL

## 2024-03-13 ASSESSMENT — LIFESTYLE VARIABLES
HOW MANY STANDARD DRINKS CONTAINING ALCOHOL DO YOU HAVE ON A TYPICAL DAY: PATIENT DOES NOT DRINK
HOW OFTEN DO YOU HAVE 6 OR MORE DRINKS ON ONE OCCASION: NEVER
SKIP TO QUESTIONS 9-10: 1
HOW OFTEN DO YOU HAVE A DRINK CONTAINING ALCOHOL: NEVER
AUDIT-C TOTAL SCORE: 0
AUDIT-C TOTAL SCORE: 0

## 2024-03-13 ASSESSMENT — PAIN SCALES - GENERAL: PAINLEVEL: 0 - NO PAIN

## 2024-03-13 NOTE — H&P
"Obstetrical TRIAGE History and Physical     Zulema Oliveira is a 33 y.o. . 19w1d by LMP      Chief Complaint: loss of fluid      Assessment/Plan      R/o PPROM  - Cervix: closed  - SSE: neg x3 for rupture  - pt feels reassured   - S/sx of labor reviewed  - Recommended tylenol, warm showers, hot packs for discomfort    IUP at 19w1d  -   - Continue routine prenatal care, next appt: 3/18  - Precautions to return discussed    Maternal Well-being  - All questions and concerns addressed      Discussed with Dr Ema Gamboa, APRN-CNP      Active Problems:  There are no active Hospital Problems.      Pregnancy Problems (from 23 to present)       Problem Noted Resolved    Depression during pregnancy in second trimester 3/5/2024 by Samir Arrieta, PhD No    Priority:  Medium      Nausea and vomiting in pregnancy prior to 22 weeks gestation 1/15/2024 by Tito Watson MD No    Priority:  Medium      Encounter for supervision of normal pregnancy in first trimester 2023 by Tito Watson MD No    Priority:  Medium      Overview Addendum 3/12/2024 10:39 AM by Tito Watson MD     [x] Initial BMI: 31  [x] Dating: LMP  [x] Prenatal Labs: wnl  [x] Genetic Screening:  defer at this time  [x] Baby ASA: h/o PreE - start at 12w - Rx sent  [x] Anatomy US: cleared  3/12  [] 1hr GCT at 24-28wks:  [] Tdap (27-36wks):  [x] Flu Shot: s/p 10/1/23  [x] COVID vaccine: declines  [] Rhogam (if Rh neg):   [] GBS at 36 wks:  [x] Feeding: breast - desires referral to lactation later in pregnancy after difficulties in last  [x] Postpartum Birth control method: likely post placental IUD  [] 39 weeks discussion of IOL vs. Expectant management:  [] Mode of delivery:                  Subjective   Zulema is for leakage of fluid. Reports while turning over in bed overnight had a small \"gush of fluid\" which was clear. Small amount of leakage of fluid occurred again this morning. Has not yet " felt fetal movement in this pregnancy. Denies vag bleeding, contractions.         Obstetrical History   OB History    Para Term  AB Living   2 1 1 0 0 1   SAB IAB Ectopic Multiple Live Births   0 0 0 0 1      # Outcome Date GA Lbr Stephon/2nd Weight Sex Delivery Anes PTL Lv   2 Current            1 Term 22 38w0d  3.317 kg M Vag-Spont EPI  OPHELIA       Past Medical History  No past medical history on file.       Past Surgical History   Past Surgical History:   Procedure Laterality Date    OTHER SURGICAL HISTORY  2020    No history of surgery       Social History  Social History     Tobacco Use    Smoking status: Never    Smokeless tobacco: Never   Substance Use Topics    Alcohol use: Yes     Comment: social     Substance and Sexual Activity   Drug Use Never       Allergies  Patient has no known allergies.     Medications  No medications prior to admission.       Objective    Last Vitals  Temp Pulse Resp BP MAP O2 Sat                     Physical Examination  GENERAL: Examination reveals a well developed, well nourished, gravid female in no acute distress. She is alert and cooperative.  ABDOMEN: soft, gravid, nontender, nondistended, no abnormal masses, no epigastric pain    VAGINA: SSE neg x3 for rupture  CERVIX: closed      Lab Review  Labs in chart were reviewed.

## 2024-03-13 NOTE — TELEPHONE ENCOUNTER
Pt calling to state she had leakage of fluid during the night last night while in bed.   It is has continued this morning upon awakening.   Pt c/o thin, pink-tinged watery discharge.   Pt instructed to be seen on L&D for for further eval.  Understanding voiced.

## 2024-03-14 ENCOUNTER — TELEMEDICINE (OUTPATIENT)
Dept: BEHAVIORAL HEALTH | Facility: CLINIC | Age: 34
End: 2024-03-14
Payer: COMMERCIAL

## 2024-03-14 DIAGNOSIS — F41.8 OTHER SPECIFIED ANXIETY DISORDERS: ICD-10-CM

## 2024-03-14 DIAGNOSIS — F32.A DEPRESSION DURING PREGNANCY IN SECOND TRIMESTER (HHS-HCC): Primary | ICD-10-CM

## 2024-03-14 DIAGNOSIS — O99.342 DEPRESSION DURING PREGNANCY IN SECOND TRIMESTER (HHS-HCC): Primary | ICD-10-CM

## 2024-03-14 PROCEDURE — 90837 PSYTX W PT 60 MINUTES: CPT | Performed by: PSYCHOLOGIST

## 2024-03-14 NOTE — PROGRESS NOTES
Behavioral Medicine Psychotherapy Progress Note      IDENTIFYING AND REFERRAL INFORMATION:  Zulema Oliveira is a 33 y.o. able-bodied employed,  White female parent of one who is currently pregnant. She presented for follow-up.     Start Time: 3 pm  End Time: 4 pm  Time Spent with Patient: 54 minutes    Session number 2 with this psychologist.    This is a virtual video visit.     Telephone/Televideo Informed Consent for Psychotherapy was reviewed with the patient as follows:  There are potential benefits and risks of the use of telephone or video-conferencing that differ from in-person sessions. Specifically, the telephone or televideo system we are using may not be HIPPA compliant and may present limits to patient confidentiality. Confidentiality still applies for telepsychology services, and nobody will record the session without your permission. You agree to use the telephone or video-conferencing platform selected for our virtual sessions.    Understanding and verbal agreement was attested to by the patient.    The purpose of the meeting was reviewed as indicated, and limits of confidentiality reviewed as needed. Patient stated an understanding of the information and agreed to the session.       Symptoms: anxiety, low mood, crying more easily than usual, feeling inadequate, ruminating on not being a good enough parent, fatigue (some pregnancy related), intermittent guilt.     Focus of treatment: identifying unhelpful responses and modifying them to be more adaptive, self-supportive       Session Content:  She acknowledged she ruminates and talks often with her  about how good of a job she's doing as a parent. She indicated her examples of good parenting are her friend she sees for a few hours once a week and her memories of her parents, as well as general messaging she's gotten. She feels guilty on the weekends for taking time for housework and at the end of the day will ruminate on it, thinking she  "should have spent more time with her son. Through reflection she was able to recognize her expectations are unrealistic because there is no other time for housework to get done. She discussed the trade off of choosing chores out of necessity and having to take time away from her son to do them, like the trade off of going to work. Explored what may help shift her expectations. She thought of self-affirmations which helped her be self-supportive in graduate school. Also discussed secure attachment style and the \"good enough mother\" concept from developmental research. She wanted to incorporate this information into an affirmation.     Additional historical information:   , son is one. Pregnant with second. 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     Current Psychiatric Medications:  None noted.     Objective:  Mental Status  Orientation and consciousness: [x ]oriented X 3 and attentive     [ ]other, explain:   Appearance/grooming :    [x ]appropriate [ ]poor grooming/hygiene bizarre appearance    [ ]other, explain:    Behavior: [x ]appropriate to context.   [ ]inappropriate and/or bizarre, explain:    Speech: [x]normal rate/rhythm [ ]pressured speech []slow    [ ]other,    Language: [x ]normal    [ ]abnormalities noted, explain:    Mood: [ ]euthymic [ ]depressed [ ]dysphoric [ x]anxious    [ ]euphoric [ ] other   Affect: [x]mood congruent    []incongruent, explain:    Evidence of perceptual disturbances (hallucinations, illusions, etc.):    [ x]no    [ ]yes, explain:   Thought processes:     [x ]normal and congruent     [ ]other, explain:    Insight: [ ]good [x ]adequate [ ]poor []questionable   Judgment: [ ]good [x ]adequate [ ]poor []questionable   Fund of Knowledge:[ ]above average [x ]average [ ]below average    Risk Assessment  Homicidal intentions: no  Homicidal plan: no  Suicidal intentions: no  Suicidal plan: no  Risk of harm low at this time. "       Therapeutic Intervention:   [ x] Psychosocial  [ ] Treatment Goals  [ x] Cognitive/Behavioral   [ x] Psychoeducation   [ ] Insight/Psychodynamic  [ ] Problem solving  [ x] Supportive  [ ] Referral to additional/other treatment/resources      Diagnosis: depressive disorder, unspecified, during pregnancy  Anxiety disorder unspecified (R/O ERINN)    Treatment Plan/Recommendations:   rtc in about 2 weeks for individual psychotherapy with this psychologist via video telehealth. Patient agreed to appointment frequency and plan. Patient has scheduling contact info to use as needed. Upcoming appointment already scheduled.       Prognosis: good    Progress to date: some, as noted.       Samir Arrieta, PhD

## 2024-03-18 ENCOUNTER — APPOINTMENT (OUTPATIENT)
Dept: OBSTETRICS AND GYNECOLOGY | Facility: CLINIC | Age: 34
End: 2024-03-18
Payer: COMMERCIAL

## 2024-03-21 ENCOUNTER — APPOINTMENT (OUTPATIENT)
Dept: BEHAVIORAL HEALTH | Facility: CLINIC | Age: 34
End: 2024-03-21
Payer: COMMERCIAL

## 2024-04-02 ENCOUNTER — TELEMEDICINE (OUTPATIENT)
Dept: BEHAVIORAL HEALTH | Facility: CLINIC | Age: 34
End: 2024-04-02
Payer: COMMERCIAL

## 2024-04-02 DIAGNOSIS — F41.8 OTHER SPECIFIED ANXIETY DISORDERS: Primary | ICD-10-CM

## 2024-04-02 DIAGNOSIS — F32.A DEPRESSION DURING PREGNANCY IN SECOND TRIMESTER (HHS-HCC): ICD-10-CM

## 2024-04-02 DIAGNOSIS — O99.342 DEPRESSION DURING PREGNANCY IN SECOND TRIMESTER (HHS-HCC): ICD-10-CM

## 2024-04-02 PROCEDURE — 90834 PSYTX W PT 45 MINUTES: CPT | Performed by: PSYCHOLOGIST

## 2024-04-02 NOTE — PROGRESS NOTES
"Behavioral Medicine Psychotherapy Progress Note      IDENTIFYING AND REFERRAL INFORMATION:  Zulema Oliveira is a 33 y.o. able-bodied employed,  White female parent of one who is currently pregnant. She presented for follow-up.     Start Time: 1 pm  End Time: 2 pm  Time Spent with Patient: 44 minutes    Session number 3 with this psychologist.    This is a virtual video visit.     Telephone/Televideo Informed Consent for Psychotherapy was reviewed with the patient as follows:  There are potential benefits and risks of the use of telephone or video-conferencing that differ from in-person sessions. Specifically, the telephone or televideo system we are using may not be HIPPA compliant and may present limits to patient confidentiality. Confidentiality still applies for telepsychology services, and nobody will record the session without your permission. You agree to use the telephone or video-conferencing platform selected for our virtual sessions.    Understanding and verbal agreement was attested to by the patient.    The purpose of the meeting was reviewed as indicated, and limits of confidentiality reviewed as needed. Patient stated an understanding of the information and agreed to the session.       Symptoms: anxiety, low mood, crying more easily than usual, feeling inadequate, ruminating on not being a good enough parent, fatigue (some pregnancy related), intermittent guilt.     Focus of treatment: identifying unhelpful responses and modifying them to be more adaptive, self-supportive       Session Content:  She acknowledged she ruminates and talks often with her  about how good of a job she's doing as a parent. Can get wrapped up in other people's perception of her and what she thinks they expect of her; this often drives her ruminations. She gets frustrated that she's repeatedly trying to please others and not focusing on her own needs. \"I get upset that I'm not being true to myself.\" She recognized " "this is an established pattern for her, one that she wanted to change but was unsure how to. Finds it difficult to deal with feeling like she upset or let someone else down; this can reinforce people pleasing instead of focusing on her own priorities. Provider suggested starting with clarifying personal values as a guidepost, to center her decisions around what matters most to her instead of what she thinks matters to others.     Patient said she has also been practicing the self affirmation identified at last session as well as the concept of the \"good enough mother\" with benefit.     Additional historical information:   , one child. Son Nakul is one. Pregnant with second. 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     Current Psychiatric Medications:  None noted.     Objective:  Mental Status  Orientation and consciousness: [x ]oriented X 3 and attentive     [ ]other, explain:   Appearance/grooming :    [x ]appropriate [ ]poor grooming/hygiene bizarre appearance    [ ]other, explain:    Behavior: [x ]appropriate to context.   [ ]inappropriate and/or bizarre, explain:    Speech: [x]normal rate/rhythm [ ]pressured speech []slow    [ ]other,    Language: [x ]normal    [ ]abnormalities noted, explain:    Mood: [ ]euthymic [ ]depressed [ ]dysphoric [ x]anxious    [ ]euphoric [ ] other   Affect: [x]mood congruent    []incongruent, explain:    Evidence of perceptual disturbances (hallucinations, illusions, etc.):    [ x]no    [ ]yes, explain:   Thought processes:     [x ]normal and congruent     [ ]other, explain:    Insight: [ ]good [x ]adequate [ ]poor []questionable   Judgment: [ ]good [x ]adequate [ ]poor []questionable   Fund of Knowledge:[ ]above average [x ]average [ ]below average    Risk Assessment  Homicidal intentions: no  Homicidal plan: no  Suicidal intentions: no  Suicidal plan: no  Risk of harm low at this time.       Therapeutic Intervention:   [ x] " Psychosocial  [ ] Treatment Goals  [ x] Cognitive/Behavioral   [ x] Psychoeducation   [ ] Insight/Psychodynamic  [ ] Problem solving  [ x] Supportive  [ ] Referral to additional/other treatment/resources      Diagnosis: depressive disorder, unspecified, during pregnancy  Anxiety disorder unspecified (R/O ERINN)    Treatment Plan/Recommendations:   rtc in about 2 weeks for individual psychotherapy with this psychologist via video telehealth. Patient agreed to appointment frequency and plan. Patient has scheduling contact info to use as needed. Upcoming appointment already scheduled.     Patient to review the values worksheets sent via email.       Prognosis: good    Progress to date: some, as noted.       Samir Arrieta, PhD

## 2024-04-11 ENCOUNTER — ROUTINE PRENATAL (OUTPATIENT)
Dept: OBSTETRICS AND GYNECOLOGY | Facility: CLINIC | Age: 34
End: 2024-04-11
Payer: COMMERCIAL

## 2024-04-11 VITALS — BODY MASS INDEX: 33.72 KG/M2 | DIASTOLIC BLOOD PRESSURE: 60 MMHG | WEIGHT: 235 LBS | SYSTOLIC BLOOD PRESSURE: 118 MMHG

## 2024-04-11 DIAGNOSIS — K59.01 SLOW TRANSIT CONSTIPATION: Primary | ICD-10-CM

## 2024-04-11 DIAGNOSIS — O99.342 DEPRESSION DURING PREGNANCY IN SECOND TRIMESTER (HHS-HCC): ICD-10-CM

## 2024-04-11 DIAGNOSIS — F32.A DEPRESSION DURING PREGNANCY IN SECOND TRIMESTER (HHS-HCC): ICD-10-CM

## 2024-04-11 DIAGNOSIS — Z34.91 ENCOUNTER FOR SUPERVISION OF NORMAL PREGNANCY IN FIRST TRIMESTER, UNSPECIFIED GRAVIDITY (HHS-HCC): ICD-10-CM

## 2024-04-11 DIAGNOSIS — O21.9 NAUSEA AND VOMITING IN PREGNANCY PRIOR TO 22 WEEKS GESTATION (HHS-HCC): ICD-10-CM

## 2024-04-11 PROCEDURE — 0501F PRENATAL FLOW SHEET: CPT | Performed by: STUDENT IN AN ORGANIZED HEALTH CARE EDUCATION/TRAINING PROGRAM

## 2024-04-11 RX ORDER — AMOXICILLIN 250 MG
1 CAPSULE ORAL 2 TIMES DAILY
Qty: 180 TABLET | Refills: 3 | Status: SHIPPED | OUTPATIENT
Start: 2024-04-11 | End: 2025-04-11

## 2024-04-11 NOTE — ASSESSMENT & PLAN NOTE
Doing well, feeling movement  Some dependent vulvar swelling, notices after walks - reviewed ice/heat, sitz baths, rest  GCT at next visit  Tdap at 28w  RTC 4 weeks

## 2024-04-11 NOTE — PROGRESS NOTES
Subjective   Patient ID [id number]   Zulema Oliveira is a 33 y.o.  at 23w2d with a working estimated date of delivery of 2024, by Last Menstrual Period who presents for a routine prenatal visit. She denies vaginal bleeding, leakage of fluid, decreased fetal movements, or contractions.      Objective   Physical Exam  Weight: 107 kg (235 lb)  Expected Total Weight Gain: 7 kg (15 lb)-11.5 kg (25 lb)   Pregravid BMI: 28.70  BP: 118/60  Fetal Heart Rate: 146        Assessment/Plan   Problem List Items Addressed This Visit             ICD-10-CM       Pregnancy    Encounter for supervision of normal pregnancy in first trimester Z34.91     Doing well, feeling movement  Some dependent vulvar swelling, notices after walks - reviewed ice/heat, sitz baths, rest  GCT at next visit  Tdap at 28w  RTC 4 weeks         RESOLVED: Nausea and vomiting in pregnancy prior to 22 weeks gestation O21.9    Depression during pregnancy in second trimester O99.342, F32.A       Other    Slow transit constipation - Primary K59.01     Senna Rx sent  Miralax daily  Reviewed hydration         Relevant Medications    sennosides-docusate sodium (Matilde-Colace) 8.6-50 mg tablet

## 2024-04-15 ENCOUNTER — TELEMEDICINE (OUTPATIENT)
Dept: BEHAVIORAL HEALTH | Facility: CLINIC | Age: 34
End: 2024-04-15
Payer: COMMERCIAL

## 2024-04-15 DIAGNOSIS — F32.A DEPRESSION DURING PREGNANCY IN SECOND TRIMESTER (HHS-HCC): ICD-10-CM

## 2024-04-15 DIAGNOSIS — O99.342 DEPRESSION DURING PREGNANCY IN SECOND TRIMESTER (HHS-HCC): ICD-10-CM

## 2024-04-15 DIAGNOSIS — F41.1 GENERALIZED ANXIETY DISORDER: Primary | Chronic | ICD-10-CM

## 2024-04-15 PROCEDURE — 90834 PSYTX W PT 45 MINUTES: CPT | Performed by: PSYCHOLOGIST

## 2024-04-15 NOTE — PROGRESS NOTES
Behavioral Medicine Psychotherapy Progress Note      IDENTIFYING AND REFERRAL INFORMATION:  Zulema Oliveira is a 33 y.o. able-bodied employed,  White female parent of one who is currently pregnant. She presented for follow-up.     Start Time: 2 pm  End Time: 3 pm  Time Spent with Patient: 48 minutes    Session number 4 with this psychologist.    This is a virtual video visit.     Telephone/Televideo Informed Consent for Psychotherapy was reviewed with the patient as follows:  There are potential benefits and risks of the use of telephone or video-conferencing that differ from in-person sessions. Specifically, the telephone or televideo system we are using may not be HIPPA compliant and may present limits to patient confidentiality. Confidentiality still applies for telepsychology services, and nobody will record the session without your permission. You agree to use the telephone or video-conferencing platform selected for our virtual sessions.    Understanding and verbal agreement was attested to by the patient.    The purpose of the meeting was reviewed as indicated, and limits of confidentiality reviewed as needed. Patient stated an understanding of the information and agreed to the session.       Symptoms: anxiety, low mood, crying more easily than usual, feeling inadequate, ruminating on not being a good enough parent, fatigue (some pregnancy related), intermittent guilt.     Focus of treatment: identifying unhelpful responses and modifying them to be more adaptive, self-supportive       Session Content:  She acknowledged she ruminates and talks often with her  about how good of a job she's doing with several things, including doing a good job as a parent. Can get wrapped up in other people's perception of her and what she thinks they expect of her; this often drives her ruminations. She gets frustrated that she's repeatedly trying to please others and not focusing on her own needs. She is visiting  "her grandmother today, which is another source of guilt because she feels like she should be visiting more often like some of her other family does (who live closer). She tried out the values worksheets, found questions overwhelming when came to question of how much time is devoted to the various categories listed (work/professional focus, family, self-care, etc.). She talked with her  about her strong response and he suggested she is not considering herself in any responses that she is thinking of what others would want. She finds it difficult to accept she's going to have to limit some of her engagement with others in order to fulfill personal priorities, new roles, etc. She recognized she's having difficulty with acceptance, in context of her people pleasing habits. She processed it further. Provider gave additional information about values as elements of personal identity. She wanted to revisit the values worksheets with this in mind.     Patient also recognized a habit of focusing on what she feels like she is not doing well, instead of what she has done well. She noted her  can help change her perspective by asking her to identify the good in a day, but she has trouble doing it on her own. She would like to practice acknowledging her successes because she recognizes her current approach fosters self-criticism and negative self image. She thought an appreciation journal may help, as a regular practice of recognizing her appreciation for her efforts in a day (up to 3 examples).     Patient said she has also been practicing the self affirmation identified at last session as well as the concept of the \"good enough mother\" with benefit. Wants to use the appreciation journal to expand off this practice.     Additional historical information:   , one child. Son Nakul is one. Pregnant with second. 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     Current " Psychiatric Medications:  None noted.     Objective:  Mental Status  Orientation and consciousness: [x ]oriented X 3 and attentive     [ ]other, explain:   Appearance/grooming :    [x ]appropriate [ ]poor grooming/hygiene bizarre appearance    [ ]other, explain:    Behavior: [x ]appropriate to context. Easily teary.   [ ]inappropriate and/or bizarre, explain:    Speech: [x]normal rate/rhythm [ ]pressured speech []slow    [ ]other,    Language: [x ]normal    [ ]abnormalities noted, explain:    Mood: [ ]euthymic [ ]depressed [ ]dysphoric [ x]anxious    [ ]euphoric [ ] other   Affect: [x]mood congruent    []incongruent, explain:    Evidence of perceptual disturbances (hallucinations, illusions, etc.):    [ x]no    [ ]yes, explain:   Thought processes:     [x ]normal and congruent     [ ]other, explain:    Insight: [ ]good [x ]adequate [ ]poor []questionable   Judgment: [ ]good [x ]adequate [ ]poor []questionable   Fund of Knowledge:[ ]above average [x ]average [ ]below average    Risk Assessment  Homicidal intentions: no  Homicidal plan: no  Suicidal intentions: no  Suicidal plan: no  Risk of harm low at this time.       Therapeutic Intervention:   [ x] Psychosocial  [ ] Treatment Goals  [ x] Cognitive/Behavioral   [ x] Psychoeducation   [ ] Insight/Psychodynamic  [ ] Problem solving  [ x] Supportive  [ ] Referral to additional/other treatment/resources      Diagnosis:   ERINN  depressive disorder, unspecified, during pregnancy    Treatment Plan/Recommendations:   rtc in about 2 weeks for individual psychotherapy with this psychologist via video telehealth. Patient agreed to appointment frequency and plan. Patient has scheduling contact info to use as needed. Upcoming appointment already scheduled.     Patient to review the values worksheets sent via email.   Patient to try appreciation journal daily.       Prognosis: good    Progress to date: some, as noted.       Samir Arrieta, PhD

## 2024-04-30 ENCOUNTER — APPOINTMENT (OUTPATIENT)
Dept: BEHAVIORAL HEALTH | Facility: CLINIC | Age: 34
End: 2024-04-30
Payer: COMMERCIAL

## 2024-05-06 ENCOUNTER — ROUTINE PRENATAL (OUTPATIENT)
Dept: OBSTETRICS AND GYNECOLOGY | Facility: CLINIC | Age: 34
End: 2024-05-06
Payer: COMMERCIAL

## 2024-05-06 ENCOUNTER — LAB (OUTPATIENT)
Dept: LAB | Facility: LAB | Age: 34
End: 2024-05-06
Payer: COMMERCIAL

## 2024-05-06 VITALS — SYSTOLIC BLOOD PRESSURE: 114 MMHG | BODY MASS INDEX: 34.01 KG/M2 | DIASTOLIC BLOOD PRESSURE: 72 MMHG | WEIGHT: 237 LBS

## 2024-05-06 DIAGNOSIS — F32.A DEPRESSION DURING PREGNANCY IN SECOND TRIMESTER (HHS-HCC): Primary | ICD-10-CM

## 2024-05-06 DIAGNOSIS — Z34.91 ENCOUNTER FOR SUPERVISION OF NORMAL PREGNANCY IN FIRST TRIMESTER, UNSPECIFIED GRAVIDITY (HHS-HCC): ICD-10-CM

## 2024-05-06 DIAGNOSIS — Z87.59 HISTORY OF PRE-ECLAMPSIA: ICD-10-CM

## 2024-05-06 DIAGNOSIS — O99.342 DEPRESSION DURING PREGNANCY IN SECOND TRIMESTER (HHS-HCC): Primary | ICD-10-CM

## 2024-05-06 DIAGNOSIS — R00.2 PALPITATIONS: ICD-10-CM

## 2024-05-06 LAB
COTININE UR QL SCN: NEGATIVE
ERYTHROCYTE [DISTWIDTH] IN BLOOD BY AUTOMATED COUNT: 12.2 % (ref 11.5–14.5)
GLUCOSE 1H P 50 G GLC PO SERPL-MCNC: 126 MG/DL
HCT VFR BLD AUTO: 37.4 % (ref 36–46)
HGB BLD-MCNC: 12.6 G/DL (ref 12–16)
MCH RBC QN AUTO: 31.5 PG (ref 26–34)
MCHC RBC AUTO-ENTMCNC: 33.7 G/DL (ref 32–36)
MCV RBC AUTO: 94 FL (ref 80–100)
NRBC BLD-RTO: 0 /100 WBCS (ref 0–0)
PLATELET # BLD AUTO: 153 X10*3/UL (ref 150–450)
RBC # BLD AUTO: 4 X10*6/UL (ref 4–5.2)
REFLEX ADDED, ANEMIA PANEL: NORMAL
TREPONEMA PALLIDUM IGG+IGM AB [PRESENCE] IN SERUM OR PLASMA BY IMMUNOASSAY: NONREACTIVE
WBC # BLD AUTO: 5.9 X10*3/UL (ref 4.4–11.3)

## 2024-05-06 PROCEDURE — 82947 ASSAY GLUCOSE BLOOD QUANT: CPT

## 2024-05-06 PROCEDURE — 36415 COLL VENOUS BLD VENIPUNCTURE: CPT

## 2024-05-06 PROCEDURE — 0501F PRENATAL FLOW SHEET: CPT | Performed by: STUDENT IN AN ORGANIZED HEALTH CARE EDUCATION/TRAINING PROGRAM

## 2024-05-06 PROCEDURE — 85027 COMPLETE CBC AUTOMATED: CPT

## 2024-05-06 PROCEDURE — 86780 TREPONEMA PALLIDUM: CPT

## 2024-05-06 NOTE — ASSESSMENT & PLAN NOTE
Brought home BP cuff to compare - in-office 114/72 (auscultation/manual), home cuff 147/89 - recommend trying alternate brand of BP cuff to see if more accurate

## 2024-05-06 NOTE — ASSESSMENT & PLAN NOTE
1h GCT today  Wishes to see pelvic floor PT - order placed  Wishes to see lactation prenatally - Ya messaged  RTC 2 weeks

## 2024-05-06 NOTE — PROGRESS NOTES
Subjective   Patient ID [id number]   Zulema Oliveira is a 33 y.o.  at 26w6d with a working estimated date of delivery of 2024, by Last Menstrual Period who presents for a routine prenatal visit. She denies vaginal bleeding, leakage of fluid, decreased fetal movements, or contractions.    Objective   Physical Exam  Weight: 108 kg (237 lb)  Expected Total Weight Gain: 7 kg (15 lb)-11.5 kg (25 lb)   Pregravid BMI: 28.70  BP: 114/72  Fetal Heart Rate: 150        Assessment/Plan   Problem List Items Addressed This Visit             ICD-10-CM       Pregnancy    Encounter for supervision of normal pregnancy in first trimester ([facility]) Z34.91     1h GCT today  Wishes to see pelvic floor PT - order placed  Wishes to see lactation prenatally - Ya messaged  RTC 2 weeks         Relevant Orders    Glucose, 1 Hour Screen, Pregnancy    CBC Anemia Panel With Reflex, Pregnancy    Syphilis Screen with Reflex    Referral to Physical Therapy    Depression during pregnancy in second trimester ([facility]) - Primary O99.342, F32.A       Other    Palpitations R00.2    History of pre-eclampsia Z87.59     Brought home BP cuff to compare - in-office 114/72 (auscultation/manual), home cuff 147/89 - recommend trying alternate brand of BP cuff to see if more accurate

## 2024-05-14 ENCOUNTER — TELEMEDICINE (OUTPATIENT)
Dept: BEHAVIORAL HEALTH | Facility: CLINIC | Age: 34
End: 2024-05-14
Payer: COMMERCIAL

## 2024-05-14 DIAGNOSIS — O99.342 DEPRESSION DURING PREGNANCY IN SECOND TRIMESTER (HHS-HCC): Primary | ICD-10-CM

## 2024-05-14 DIAGNOSIS — F41.1 GENERALIZED ANXIETY DISORDER: Chronic | ICD-10-CM

## 2024-05-14 DIAGNOSIS — F32.A DEPRESSION DURING PREGNANCY IN SECOND TRIMESTER (HHS-HCC): Primary | ICD-10-CM

## 2024-05-14 PROCEDURE — 90837 PSYTX W PT 60 MINUTES: CPT | Performed by: PSYCHOLOGIST

## 2024-05-14 NOTE — PROGRESS NOTES
"Behavioral Medicine Psychotherapy Progress Note      IDENTIFYING AND REFERRAL INFORMATION:  Zulema Oliveira is a 33 y.o. able-bodied employed,  White female parent of one who is currently pregnant. She presented for follow-up.     Start Time: 4 pm  End Time: 5 pm  Time Spent with Patient: 54 minutes    Session number 5 with this psychologist.    This is a virtual video visit.     Telephone/Televideo Informed Consent for Psychotherapy was reviewed with the patient as needed. The purpose of the meeting was reviewed as indicated, and limits of confidentiality reviewed as needed. Patient stated an understanding of the information and agreed to the session.       Symptoms: anxiety, low mood, crying more easily than usual, feeling inadequate, ruminating on not being a good enough parent, fatigue (some pregnancy related), intermittent guilt.     Focus of treatment: identifying unhelpful responses and modifying them to be more adaptive, self-supportive       Session Content:  She noted it has been challenging the last couple weeks, feeling like she is not doing enough in context of getting sick and feeling fatigued as her pregnancy advances. She was frustrated yesterday she could not get more yardwork done, and started to make comparisons and criticize herself. She indicated she will assume others can do more and she is not doing enough. She acknowledged she's started to feel \"not good enough.\" This triggered sudden emotions for her; she acknowledged it is a stuck point. Provider suggested she is still holding on to expectations that are unrealistic and having difficulty acknowledging she has limits. She is assessing her limits as negative, unwanted. Explored strategies for changing her self talk; she wanted to refocus on doing self-affirmations. Had gotten away from it with focus on new strategies like journaling. Finding the journaling helpful for staying in the moment and noticing gratitude some of the time. But " "has not changed her self-talk. Hoping affirmations can be a start to change self-talk. Provider gave info on common types of distorted thinking and suggested it can be another resource for making changes.     She finds it difficult to accept she's going to have to limit some of her engagement with others in order to fulfill personal priorities, new roles, etc. She recognized she's having difficulty with acceptance, in context of her people pleasing habits. Suggested distorted thinking is one thing reinforcing it.     She wants to continue the appreciation journal, as a regular practice of recognizing her appreciation for her efforts in a day (up to 3 examples). She also continues to remind herself of the \"good enough parent\" as a means of normalizing having limits to one's capacity/abilities.    Additional historical information:   , one child. Son Nakul is one. Pregnant with second. 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     Current Psychiatric Medications:  None noted.     Objective:  Mental Status  Orientation and consciousness: [x ]oriented X 3 and attentive     [ ]other, explain:   Appearance/grooming :    [x ]appropriate [ ]poor grooming/hygiene bizarre appearance    [ ]other, explain:    Behavior: [x ]appropriate to context. Easily teary.   [ ]inappropriate and/or bizarre, explain:    Speech: [x]normal rate/rhythm [ ]pressured speech []slow    [ ]other,    Language: [x ]normal    [ ]abnormalities noted, explain:    Mood: [ ]euthymic [ ]depressed [ ]dysphoric [ x]anxious    [ ]euphoric [ ] other   Affect: [x]mood congruent    []incongruent, explain:    Evidence of perceptual disturbances (hallucinations, illusions, etc.):    [ x]no    [ ]yes, explain:   Thought processes:     [x ]normal and congruent     [ ]other, explain:    Insight: [ ]good [x ]adequate [ ]poor []questionable   Judgment: [ ]good [x ]adequate [ ]poor []questionable   Fund of " Knowledge:[ ]above average [x ]average [ ]below average    Risk Assessment  Homicidal intentions: no  Homicidal plan: no  Suicidal intentions: no  Suicidal plan: no  Risk of harm low at this time.       Therapeutic Intervention:   [ x] Psychosocial  [ ] Treatment Goals  [ x] Cognitive/Behavioral- included changing negative self-talk, identifying idstorted thinking, working on A-B-C's exercise (antecedent-behavior-consequence).   [ x] Psychoeducation   [ ] Insight/Psychodynamic  [ ] Problem solving  [ x] Supportive  [ ] Referral to additional/other treatment/resources      Diagnosis:   ERINN  depressive disorder, unspecified, during pregnancy    Treatment Plan/Recommendations:   rtc in about 2 weeks for individual psychotherapy with this psychologist via video telehealth. Patient agreed to appointment frequency and plan. Patient has scheduling contact info to use as needed. Upcoming appointment already scheduled.     Patient to review the values worksheets sent via email.   Patient to continue appreciation journal daily.     Provider emailed resources- distorted thinking list and an ABC table.       Prognosis: good    Progress to date: some, as noted.       Samir Arrieta, PhD

## 2024-05-15 ENCOUNTER — APPOINTMENT (OUTPATIENT)
Dept: OBSTETRICS AND GYNECOLOGY | Facility: CLINIC | Age: 34
End: 2024-05-15
Payer: COMMERCIAL

## 2024-05-17 ENCOUNTER — TELEPHONE (OUTPATIENT)
Dept: OBSTETRICS AND GYNECOLOGY | Facility: CLINIC | Age: 34
End: 2024-05-17
Payer: COMMERCIAL

## 2024-05-17 NOTE — TELEPHONE ENCOUNTER
Pt contacted.    Discussed dr herrera's recommendation about her BPS.  Pt transferred to  to make an appt in 1-2 weeks.

## 2024-05-17 NOTE — TELEPHONE ENCOUNTER
----- Message from Zulema Oliveira sent at 5/16/2024  5:18 PM EDT -----  Regarding: Blood pressures   Contact: [phone]  Thank you so much for getting back to me! That plan sounds good to me. I just want to be extra cautious. I’ll continue to take them and bring my log. It’s reassuring it hear they seem ok being where they are at home. Is there anything else I should be doing to help my blood pressures?     Again thanks so much  Zulema

## 2024-05-23 ENCOUNTER — ROUTINE PRENATAL (OUTPATIENT)
Dept: OBSTETRICS AND GYNECOLOGY | Facility: CLINIC | Age: 34
End: 2024-05-23
Payer: COMMERCIAL

## 2024-05-23 VITALS — DIASTOLIC BLOOD PRESSURE: 78 MMHG | WEIGHT: 242 LBS | SYSTOLIC BLOOD PRESSURE: 112 MMHG | BODY MASS INDEX: 34.72 KG/M2

## 2024-05-23 DIAGNOSIS — Z87.59 HISTORY OF PRE-ECLAMPSIA: ICD-10-CM

## 2024-05-23 DIAGNOSIS — Z23 NEED FOR TDAP VACCINATION: ICD-10-CM

## 2024-05-23 DIAGNOSIS — Z34.91 ENCOUNTER FOR SUPERVISION OF NORMAL PREGNANCY IN FIRST TRIMESTER, UNSPECIFIED GRAVIDITY (HHS-HCC): Primary | ICD-10-CM

## 2024-05-23 DIAGNOSIS — R10.9 TRIGGER POINT OF ABDOMEN: ICD-10-CM

## 2024-05-23 PROCEDURE — 90471 IMMUNIZATION ADMIN: CPT | Performed by: STUDENT IN AN ORGANIZED HEALTH CARE EDUCATION/TRAINING PROGRAM

## 2024-05-23 PROCEDURE — 90715 TDAP VACCINE 7 YRS/> IM: CPT | Performed by: STUDENT IN AN ORGANIZED HEALTH CARE EDUCATION/TRAINING PROGRAM

## 2024-05-23 PROCEDURE — 0501F PRENATAL FLOW SHEET: CPT | Performed by: STUDENT IN AN ORGANIZED HEALTH CARE EDUCATION/TRAINING PROGRAM

## 2024-05-23 ASSESSMENT — EDINBURGH POSTNATAL DEPRESSION SCALE (EPDS)
I HAVE BLAMED MYSELF UNNECESSARILY WHEN THINGS WENT WRONG: YES, MOST OF THE TIME
I HAVE BEEN SO UNHAPPY THAT I HAVE BEEN CRYING: NO, NEVER
I HAVE LOOKED FORWARD WITH ENJOYMENT TO THINGS: AS MUCH AS I EVER DID
I HAVE BEEN ABLE TO LAUGH AND SEE THE FUNNY SIDE OF THINGS: AS MUCH AS I ALWAYS COULD
I HAVE BEEN SO UNHAPPY THAT I HAVE HAD DIFFICULTY SLEEPING: NOT AT ALL
I HAVE FELT SAD OR MISERABLE: NO, NOT AT ALL
I HAVE BEEN ANXIOUS OR WORRIED FOR NO GOOD REASON: YES, SOMETIMES
THE THOUGHT OF HARMING MYSELF HAS OCCURRED TO ME: NEVER
I HAVE FELT SCARED OR PANICKY FOR NO GOOD REASON: YES, SOMETIMES
TOTAL SCORE: 9
THINGS HAVE BEEN GETTING ON TOP OF ME: YES, SOMETIMES I HAVEN'T BEEN COPING AS WELL AS USUAL

## 2024-05-23 NOTE — PROGRESS NOTES
Ms. Zulema Oliveira is a 33 y.o. yo  patient at 29w2d presenting for prenatal care.     Assessment/Plan   Problem List Items Addressed This Visit          Pregnancy    Encounter for supervision of normal pregnancy in first trimester ([facility]) - Primary    Overview     [x] Initial BMI: 31  [x] Dating: LMP  [x] Prenatal Labs: wnl  [x] Genetic Screening:  defer at this time  [x] Baby ASA: h/o PreE - start at 12w - Rx sent  [x] Anatomy US: cleared  3/12  [x] 1hr GCT at 24-28wks: 126  [x] Tdap (27-36wks): 24   [x] Flu Shot: s/p 10/1/23  [x] COVID vaccine: declines  [x] Rhogam (if Rh neg): n/a  [] GBS at 36 wks:  [x] Feeding: breast - desires referral to lactation later in pregnancy after difficulties in last  [x] Postpartum Birth control method: likely post placental IUD  [] 39 weeks discussion of IOL vs. Expectant management:  [] Mode of delivery:          Current Assessment & Plan     Presents today to discuss BP management and compare home cuff with clinic cuff.   Home cuff read 149/80, manual clinic 112/78  Reviewed that overall trend of BP relative to baseline is as important as individual values. Discussed plan if BP should start to increase including lab surveillance or triage assessment. Reviewed pressures requiring emergent attention (>160 systolic or >110 diastolic). Reviewed signs and symptoms requiring emergent attention. All questions solicited and answered.   Otherwise doing well with no concern.   Tdap today.            Other    History of pre-eclampsia    Overview     Induced at 38 weeks         Trigger point of abdomen    Overview     History of trigger points after last delivery   Requests referral to PT around 30 weeks to minimize risk  PT visit first week of           Other Visit Diagnoses       Need for Tdap vaccination        Relevant Orders    Tdap vaccine, age 7 years and older  (BOOSTRIX) (Completed)            Tito Watson MD

## 2024-05-23 NOTE — ASSESSMENT & PLAN NOTE
Presents today to discuss BP management and compare home cuff with clinic cuff.   Home cuff read 149/80, manual clinic 112/78  Reviewed that overall trend of BP relative to baseline is as important as individual values. Discussed plan if BP should start to increase including lab surveillance or triage assessment. Reviewed pressures requiring emergent attention (>160 systolic or >110 diastolic). Reviewed signs and symptoms requiring emergent attention. All questions solicited and answered.   Otherwise doing well with no concern.   Tdap today.

## 2024-05-31 ENCOUNTER — HOSPITAL ENCOUNTER (OUTPATIENT)
Dept: RADIOLOGY | Facility: CLINIC | Age: 34
Discharge: HOME | End: 2024-05-31
Payer: COMMERCIAL

## 2024-05-31 DIAGNOSIS — Z34.90 PRENATAL CARE, ANTEPARTUM (HHS-HCC): ICD-10-CM

## 2024-05-31 DIAGNOSIS — O36.5930 MATERNAL CARE FOR OTHER KNOWN OR SUSPECTED POOR FETAL GROWTH, THIRD TRIMESTER, NOT APPLICABLE OR UNSPECIFIED (HHS-HCC): ICD-10-CM

## 2024-05-31 PROCEDURE — 76816 OB US FOLLOW-UP PER FETUS: CPT | Performed by: OBSTETRICS & GYNECOLOGY

## 2024-05-31 PROCEDURE — 76816 OB US FOLLOW-UP PER FETUS: CPT

## 2024-05-31 PROCEDURE — 76819 FETAL BIOPHYS PROFIL W/O NST: CPT | Performed by: OBSTETRICS & GYNECOLOGY

## 2024-05-31 PROCEDURE — 76819 FETAL BIOPHYS PROFIL W/O NST: CPT

## 2024-06-03 ENCOUNTER — APPOINTMENT (OUTPATIENT)
Dept: OBSTETRICS AND GYNECOLOGY | Facility: CLINIC | Age: 34
End: 2024-06-03
Payer: COMMERCIAL

## 2024-06-04 ENCOUNTER — APPOINTMENT (OUTPATIENT)
Dept: BEHAVIORAL HEALTH | Facility: CLINIC | Age: 34
End: 2024-06-04
Payer: COMMERCIAL

## 2024-06-05 ENCOUNTER — ROUTINE PRENATAL (OUTPATIENT)
Dept: OBSTETRICS AND GYNECOLOGY | Facility: CLINIC | Age: 34
End: 2024-06-05
Payer: COMMERCIAL

## 2024-06-05 VITALS — WEIGHT: 243 LBS | DIASTOLIC BLOOD PRESSURE: 72 MMHG | SYSTOLIC BLOOD PRESSURE: 118 MMHG | BODY MASS INDEX: 34.87 KG/M2

## 2024-06-05 DIAGNOSIS — F32.A DEPRESSION DURING PREGNANCY IN THIRD TRIMESTER (HHS-HCC): ICD-10-CM

## 2024-06-05 DIAGNOSIS — O99.343 DEPRESSION DURING PREGNANCY IN THIRD TRIMESTER (HHS-HCC): ICD-10-CM

## 2024-06-05 DIAGNOSIS — Z87.59 HISTORY OF PRE-ECLAMPSIA: Primary | ICD-10-CM

## 2024-06-05 DIAGNOSIS — R10.9 TRIGGER POINT OF ABDOMEN: ICD-10-CM

## 2024-06-05 PROBLEM — Z34.93 ENCOUNTER FOR SUPERVISION OF NORMAL PREGNANCY IN THIRD TRIMESTER (HHS-HCC): Status: ACTIVE | Noted: 2023-12-18

## 2024-06-05 PROBLEM — O14.13 SEVERE PRE-ECLAMPSIA IN THIRD TRIMESTER (HHS-HCC): Status: RESOLVED | Noted: 2022-06-25 | Resolved: 2024-06-05

## 2024-06-05 PROBLEM — N89.8 VAGINAL CYST: Status: RESOLVED | Noted: 2022-08-05 | Resolved: 2024-06-05

## 2024-06-05 PROBLEM — O99.210 OBESITY AFFECTING PREGNANCY, ANTEPARTUM (HHS-HCC): Status: ACTIVE | Noted: 2024-02-05

## 2024-06-05 PROCEDURE — 0501F PRENATAL FLOW SHEET: CPT | Performed by: OBSTETRICS & GYNECOLOGY

## 2024-06-05 NOTE — PROGRESS NOTES
Problem List Items Addressed This Visit          Gastrointestinal and Abdominal    Trigger point of abdomen    Overview     History of trigger points after last delivery   Requests referral to PT around 30 weeks to minimize risk  PT visit first week of             Gravid and     History of pre-eclampsia - Primary    Overview     Induced at 38 weeks.   Taking asa.  Monitoring BP.            Mental Health    Depression during pregnancy in third trimester ([facility])      Follow up in 2 weeks.    Angi Corona MD    Subjective   Feels well, baby moving.  BP cuff at home running a little higher last visit.  Has been ok since.  Overall feeling good.   Starting PT this week for trigger points from vaginal delivery.  Active with 2 year old.  Objective   Physical Exam:   Weight: 110 kg (243 lb)  TW.5 kg (43 lb)  Expected Total Weight Gain: 7 kg (15 lb)-11.5 kg (25 lb)   Pregravid BMI: 28.70  BP: 118/72  Fetal Heart Rate: 145

## 2024-06-07 ENCOUNTER — EVALUATION (OUTPATIENT)
Dept: PHYSICAL THERAPY | Facility: CLINIC | Age: 34
End: 2024-06-07
Payer: COMMERCIAL

## 2024-06-07 DIAGNOSIS — N94.89 LABIAL PAIN: ICD-10-CM

## 2024-06-07 DIAGNOSIS — Z34.91 ENCOUNTER FOR SUPERVISION OF NORMAL PREGNANCY IN FIRST TRIMESTER, UNSPECIFIED GRAVIDITY (HHS-HCC): ICD-10-CM

## 2024-06-07 DIAGNOSIS — M25.552 HIP PAIN, LEFT: Primary | ICD-10-CM

## 2024-06-07 PROCEDURE — 97110 THERAPEUTIC EXERCISES: CPT | Mod: GP

## 2024-06-07 PROCEDURE — 97161 PT EVAL LOW COMPLEX 20 MIN: CPT | Mod: GP

## 2024-06-07 ASSESSMENT — PAIN SCALES - GENERAL: PAINLEVEL_OUTOF10: 5 - MODERATE PAIN

## 2024-06-07 ASSESSMENT — PAIN - FUNCTIONAL ASSESSMENT: PAIN_FUNCTIONAL_ASSESSMENT: 0-10

## 2024-06-07 NOTE — PROGRESS NOTES
Physical Therapy    Physical Therapy Evaluation and Treatment      Patient Name: Zulema Oliveira  MRN: [id number]  Today's Date: 6/7/2024  Time Calculation  Start Time: 0745  Stop Time: 0845  Time Calculation (min): 60 min    Assessment:    The patient presents to Physical Therapy with signs and symptoms consistent with the diagnosis of stress incontinence and pelvic floor myofascial dysfunction. Key impairments include: tenderness and tightness of L>R pelvic floor mm, poor pelvic mm endurance, poor pelvic floor strength, hip weakness 2/2 pain, hip joint tightness, and difficulty with functional activities such as stairs, walking and dressing.    Standardized testing and measures administered today, including ROM, strength, joint mobility testing, and observation which reveal that the patient has multiple impairments in body structure and functions, activity limitations, and participation restrictions. The patient has personal factors and comorbidities that may serve as barriers affecting plan of care. Today's findings indicate that the patient is of low complexity and would benefit from skilled PT to make measurable and meaningful changes in the above outcome measures and achieve improvements in the patient's functional status and individual goals. The patient verbalized understanding and is in agreement with all goals and plan of care.      Plan:   Pt will be seen 1-2x/week for 10 visits. Potential to achieve rehab goals is good. Plan of care was developed with input and agreement by the patient.    Treatment may include: Therapeutic Exercise (PROM, AA/AROM, Flexibility, Strengthening, Stabilization, HEP instruction). Therapeutic Activities (Transfers, Body Mechanics, Work Related Activities, Closed Chain, Agility and Power). Manual Techniques (Soft tissue Mobilization, Joint Mobilization/Distraction, Muscle Energy Techniques, Lymphatic Drainage, Dry Needling). Neuromuscular Reeducation (Postural Training,  Balance/Proprioception, Relaxation Techniques). Biofeedback. Aquatic Exercise. Modalities: Ultrasound, Cryotherapy, Vasopneumatic with or without Cryotherapy. Electrical Stimulation (TENS/IFC/ Premodulated for pain relief, NMES for Muscle reeducation). Gait Training. Orthotic Fit and Training. Strapping, Kinesiotaping.       Current Problem:   1. Hip pain, left  Follow Up In Physical Therapy      2. Encounter for supervision of normal pregnancy in first trimester, unspecified  (HHS-HCC)  Referral to Physical Therapy    Follow Up In Physical Therapy      3. Labial pain  Follow Up In Physical Therapy          Subjective    Pt is a 33  year old female with complaints of L sided labial pain, going up to hip. Worst in the morning, sharp. Always present. Sharp when rolling in bed. Achy the rest of the day. From labia up and through the hip joint.   PMH:  found a cyst in pelvic floor and removed the cyst and went of PT for trigger point release, after Kenalog injections. 2 mo of PT then another injection, all of which helped a lot.   Onset: Flare up last couple months  Aggravating factors/ Functional Limitations: walking, stairs are not too bad. Putting on pant on L pant leg hurts L hip.   Alleviating factors: none  Pain descriptors: achy and sharp, constant   Imaging: none of back or hip    Prenatal Chiropractor working on her pelvis. Sometimes manipulation corrections help with back pain. Used to have pain from back to R hip/glute which is resolving.     Pt denies night pain, unrelenting pain, unexplained weight loss 10-20lb in the last 4 weeks, loss of appetite, unusual lumps or growth, unusual fatigue.  Pt denies numbness/ tingling. Pt denies bowel or bladder changes.    Pregnancies #: 2, currently due 24  Episiotomies or  births? no  Complications: 2nd degree tearing  vaginal delivery. Pt denies forceful flexion and internal  rotation of the hip during delivery with epidural.  Leaking  during or after pregnancy? none  Diagnosis of bladder uterus, or bowel prolapse/descent?    Bladder-  Fluid consumed per day (oz.): 48 oz water.   Pad/protection per day#:  sometimes 1/day per night#: 0  Number of night trips to restroom: 2x/night  Day time frequency: 2-3ish hours  Incontinence with coughing, sneezing, physical activity? Yes, worse with a cold.   Sudden uncontrolled urge sensation? no  Do you make it to the bathroom on time? Yes  Voiding completely? yes  Post void dribble- no  Bladder irritants (caffeine, alcohol, acidic food etc) - 1 cup of coffee, no alcohol, enjoys of oranges, every couple weeks tomato sauce.   Foods/fluids- mini Gatorades from time to time    Yellow/Red Flags-  Pain while toileting? no  Blood in urine or stool? no  Difficulty emptying completely? no  Difficulty starting flow of urine? no  Sudden/extreme increased urination? no  Safe at home? yes  Life experiences that could impact medical care? no  History of abuse or exploitation? no  Scars? Not that she can tell    Reproductive-  Painful sex? Not any more.       Precautions:  Precautions  Precautions Comment: No falls in last 6 mo    Pain:  Pain Assessment  Pain Assessment: 0-10  Pain Score: 5 - Moderate pain      Objective   Edu provided and consent received for assessment internally and externally.     Lumbar ROM (degrees):  Flexion: 75%  Extension: 100%  Sidebend R/L: 75%/100%  Rotation R/L: 100% kory   HIP AROM (degrees):  Hip Flexion R/L:  90/90 back pain kory, L was hip and pelvic floor pain  Hip Internal Rotation R/L: 20/20  Hip External Rotation R/L:  40/40    Flexibility (degrees):   Hamstring 90/90 position R/L:  30/35    7/10 hip pain with rolling in bed    Strength Testing:  Hip Abduction R/L: 3+ back pain / 3+ hip pain  Hip Extension R/L:  3+ PF pain/ 3+ PF pain    Pelvis:  Alignment-  ASIS: R low  Medial Malleolus: R high   Pubic Symphysis: equal    EXTERNAL OBSERVATION:  Involuntary Contraction: WNL  Involuntary  Relaxation WNL        INTERNAL VAGINAL EXAMINATION:  PFM Strength: 2/5 --> 3/5 with verbal cues for lifting  Coordination: 10 in 10 seconds, but fatigued by 5  Endurance: 10 second holds x 3 repetition, fatigued throughout    INTERNAL PALPATION:   Moderate pain and tightness at Layers 1-3, worst more superficially L>R  Small hard nodules noted at 3 and 9 oclock at ~ layer 2    Outcome Measures:  NIH CPIS: 27    Treatments:    Therapeutic Exercise:  Pelvic Floor contraction 10x10s  Pelvic Floor Quick Flicks 1-2 x 10 reps 2s hold  LATER: hip mobility training, relaxing eccentric lengthening of pelvic floor. Ask about urine color and maybe increase fluid intake. Ask about hip joint sounds.  AFTER PREGNANCY: hip jt mobs    Edu and handout on the [employer]. Edu on pelvic wand and use after delivery. Importance of stress incontinence training now before second delivery.     Goals:  Patient will report pain levels of 0/10 at rest and 2/10 with activity.  Patient will report daytime voiding frequency 1x every 2-4 hours.    Patient will report decreased episodes of leaking by 80% or more overall.    Patient will exhibit 50% improved pelvic symmetry/sacral position to improve load transfer and gait mechanics.  Patient will demo normal resting tone in pelvic floor with a score of 5 for Pelvic Muscle Strength Rating Scale.   Patient will demo (-) SI Motion Testing.   Patient will demonstrate good lumbo-pelvic dissociation to increase postural awareness and alignment for toileting and strengthening.  Patient will demonstrate lumbar AROM to 100% for flexion, extension, side bending and rotation to improve lumbopelvic mechanics.  Patient will improve hip flexion > 120, IR >/= 40 deg, ER >/= 45 deg to balance forces through hip and pelvis.   Patient will improve hip extension and abduction strength to >/= 4+/5.  Patient will demonstrate the ability to contract-relax-and eccentrically lengthen the pelvic floor.    Patient will report  intercourse without discomfort to improve QoL.    Patient will be knowledgeable of healthy bowel/bladder habits measured by teach back.    Patient will be knowledgeable of tools/strategies to calm the ANS measured by teach back.    Patient will be independent with a home exercise program.  NIH-CPSI score will improve by 6 points to demo MCID.

## 2024-06-13 ENCOUNTER — APPOINTMENT (OUTPATIENT)
Dept: BEHAVIORAL HEALTH | Facility: CLINIC | Age: 34
End: 2024-06-13
Payer: COMMERCIAL

## 2024-06-13 DIAGNOSIS — O99.343 DEPRESSION DURING PREGNANCY IN THIRD TRIMESTER (HHS-HCC): Primary | ICD-10-CM

## 2024-06-13 DIAGNOSIS — F32.A DEPRESSION DURING PREGNANCY IN THIRD TRIMESTER (HHS-HCC): Primary | ICD-10-CM

## 2024-06-13 DIAGNOSIS — F41.1 GENERALIZED ANXIETY DISORDER: Chronic | ICD-10-CM

## 2024-06-13 PROCEDURE — 90837 PSYTX W PT 60 MINUTES: CPT | Performed by: PSYCHOLOGIST

## 2024-06-13 NOTE — PROGRESS NOTES
"Behavioral Medicine Psychotherapy Progress Note      IDENTIFYING AND REFERRAL INFORMATION:  Zulema Oliveira is a 33 y.o. able-bodied employed,  White female parent of one who is currently pregnant and dealing with exacerbation of depressive symptoms. She presented for follow-up.     Start Time: 10:40 am  End Time: 11:35 am  Time Spent with Patient: 52 minutes  Shifted start time of appointment, with patient's consent.     Session number 6 with this psychologist.    This is a virtual video visit.     Telephone/Televideo Informed Consent for Psychotherapy was reviewed with the patient as needed. The purpose of the meeting was reviewed as indicated, and limits of confidentiality reviewed as needed. Patient stated an understanding of the information and agreed to the session.       Symptoms: anxiety, low mood, crying more easily than usual, feeling inadequate, ruminating on not being a good enough parent, fatigue (some pregnancy related), intermittent guilt.     Focus of treatment: identifying unhelpful responses and modifying them to be more adaptive, self-supportive       Session Content:  She noted it has been challenging the last couple weeks, this time because of her mother's cancer diagnosis. Her  lost his father to cancer in January so it is bringing up more grief response for him and for patient. She's finding it difficult to support him and focus on her concerns for her mother. She mentioned a challenging relationship with her in-laws that's complicated her feelings about her 's loss; she wanted to revisit this issue at a later date and use today instead to focus on emotional processing and building emotional support. She said her feelings of guilt and self-criticism are showing up here as guilt about focusing on her own fears of losing her mother, instead of being a support for her mother and family. She is concerned about appearing \"too negative\" to her son. Provider validated her " "emotions, focused on worry management strategies discussed previously and how they could apply here as coping tools for her worries about loss, and explored outlets for emotional support and for comfort.       She wants to continue the appreciation journal, as a regular practice of recognizing her appreciation for her efforts in a day (up to 3 examples). She also continues to remind herself of the \"good enough parent\" as a means of normalizing having limits to one's capacity/abilities.    Additional historical information:   , one child. Son Nakul is one. Pregnant with second. 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     Current Psychiatric Medications:  None noted.     Objective:  Mental Status  Orientation and consciousness: [x ]oriented X 3 and attentive     [ ]other, explain:   Appearance/grooming :    [x ]appropriate [ ]poor grooming/hygiene bizarre appearance    [ ]other, explain:    Behavior: [x ]appropriate to context. Easily teary.   [ ]inappropriate and/or bizarre, explain:    Speech: [x]normal rate/rhythm [ ]pressured speech []slow    [ ]other,    Language: [x ]normal    [ ]abnormalities noted, explain:    Mood: [ ]euthymic [ ]depressed [ ]dysphoric [ x]anxious    [ ]euphoric [ ] other   Affect: [x]mood congruent. Appropriately teary and sad during session.   []incongruent, explain:    Evidence of perceptual disturbances (hallucinations, illusions, etc.):    [ x]no    [ ]yes, explain:   Thought processes:     [x ]normal and congruent     [ ]other, explain:    Insight: [ ]good [x ]adequate [ ]poor []questionable   Judgment: [ ]good [x ]adequate [ ]poor []questionable   Fund of Knowledge:[ ]above average [x ]average [ ]below average    Risk Assessment  Homicidal intentions: no  Homicidal plan: no  Suicidal intentions: no  Suicidal plan: no  Risk of harm low at this time.       Therapeutic Intervention:   [ x] Psychosocial  [ ] Treatment Goals  [ x] " Cognitive/Behavioral- included changing negative self-talk, identifying idstorted thinking, working on A-B-C's exercise (antecedent-behavior-consequence).   [ x] Psychoeducation   [ ] Insight/Psychodynamic  [ ] Problem solving  [ x] Supportive  [ ] Referral to additional/other treatment/resources      Diagnosis:   ERINN  depressive disorder, unspecified, during pregnancy    Treatment Plan/Recommendations:   rtc in about 2 weeks for individual psychotherapy with this psychologist via video telehealth. Patient agreed to appointment frequency and plan. Patient has scheduling contact info to use as needed. Upcoming appointment already scheduled.     Patient to review the values worksheets sent via email.   Patient to continue appreciation journal daily.     Provider emailed resources- distorted thinking list and an ABC table.       Prognosis: good    Progress to date: some, as noted.       Samir Arrieta, PhD

## 2024-06-17 ENCOUNTER — APPOINTMENT (OUTPATIENT)
Dept: OBSTETRICS AND GYNECOLOGY | Facility: CLINIC | Age: 34
End: 2024-06-17
Payer: COMMERCIAL

## 2024-06-17 VITALS — DIASTOLIC BLOOD PRESSURE: 70 MMHG | SYSTOLIC BLOOD PRESSURE: 118 MMHG | BODY MASS INDEX: 35.15 KG/M2 | WEIGHT: 245 LBS

## 2024-06-17 DIAGNOSIS — R10.9 TRIGGER POINT OF ABDOMEN: ICD-10-CM

## 2024-06-17 DIAGNOSIS — O99.210 OBESITY AFFECTING PREGNANCY, ANTEPARTUM, UNSPECIFIED OBESITY TYPE (HHS-HCC): ICD-10-CM

## 2024-06-17 DIAGNOSIS — Z87.59 HISTORY OF PRE-ECLAMPSIA: Primary | ICD-10-CM

## 2024-06-17 DIAGNOSIS — F32.A DEPRESSION DURING PREGNANCY IN THIRD TRIMESTER (HHS-HCC): ICD-10-CM

## 2024-06-17 DIAGNOSIS — O99.343 DEPRESSION DURING PREGNANCY IN THIRD TRIMESTER (HHS-HCC): ICD-10-CM

## 2024-06-17 DIAGNOSIS — Z34.83 ENCOUNTER FOR SUPERVISION OF OTHER NORMAL PREGNANCY IN THIRD TRIMESTER (HHS-HCC): ICD-10-CM

## 2024-06-17 PROCEDURE — 0501F PRENATAL FLOW SHEET: CPT | Performed by: STUDENT IN AN ORGANIZED HEALTH CARE EDUCATION/TRAINING PROGRAM

## 2024-06-17 NOTE — PATIENT INSTRUCTIONS
A resource on Breech baby: Spinning Babies  [email or web address]

## 2024-06-17 NOTE — PROGRESS NOTES
Zulema Oliveira is a 33 y.o. year old  patient at 32w6d presenting for prenatal care.     Assessment/Plan   Problem List Items Addressed This Visit          Pregnancy    Encounter for supervision of normal pregnancy in third trimester ([facility])    Overview     Contact info for lactation provided today to allow for visit antepartum if patient desires  Mild ankle swelling  Some cramping resolved with rest  No other concerns  Reviewed IOL vs spontaneous onset. Open to induction with complications but desires to go into spontaneous labor and avoid pitocin.          Depression during pregnancy in third trimester ([facility])    Overview     Following with behavioral health         Obesity affecting pregnancy, antepartum ([facility])    Overview     Pregravid BMI: 28.70  No indication for growth/ ANT           Breech presentation ([facility])    Overview     Breech at 30w growth  Discussed likelihood of flipping by 37 w and plan to re-check then  Info on Spinning Babies provided at patient request            Other    History of pre-eclampsia - Primary    Overview     Induced at 38 weeks.   Taking asa.  Monitoring BP.         Trigger point of abdomen    Overview     History of trigger points after last delivery   Following with PT            Tito Watson MD

## 2024-06-20 ENCOUNTER — APPOINTMENT (OUTPATIENT)
Dept: OBSTETRICS AND GYNECOLOGY | Facility: CLINIC | Age: 34
End: 2024-06-20
Payer: COMMERCIAL

## 2024-06-21 ENCOUNTER — TREATMENT (OUTPATIENT)
Dept: PHYSICAL THERAPY | Facility: CLINIC | Age: 34
End: 2024-06-21
Payer: COMMERCIAL

## 2024-06-21 DIAGNOSIS — Z34.91 ENCOUNTER FOR SUPERVISION OF NORMAL PREGNANCY IN FIRST TRIMESTER, UNSPECIFIED GRAVIDITY (HHS-HCC): Primary | ICD-10-CM

## 2024-06-21 DIAGNOSIS — N94.89 LABIAL PAIN: ICD-10-CM

## 2024-06-21 DIAGNOSIS — M25.552 HIP PAIN, LEFT: ICD-10-CM

## 2024-06-21 PROCEDURE — 97110 THERAPEUTIC EXERCISES: CPT | Mod: GP

## 2024-06-21 PROCEDURE — 97140 MANUAL THERAPY 1/> REGIONS: CPT | Mod: GP

## 2024-06-21 ASSESSMENT — PAIN - FUNCTIONAL ASSESSMENT: PAIN_FUNCTIONAL_ASSESSMENT: 0-10

## 2024-06-21 ASSESSMENT — PAIN SCALES - GENERAL: PAINLEVEL_OUTOF10: 5 - MODERATE PAIN

## 2024-06-21 NOTE — PROGRESS NOTES
Physical Therapy    Physical Therapy Treatment      Patient Name: Zulema Oliveira  MRN: [id number]  Today's Date: 2024  Time Calculation  Start Time: 852  Stop Time: 937  Time Calculation (min): 45 min    Assessment:  Pt demos possible radicular symptoms to R glute and was instructed in Soham method for self management of symptoms, will report effects next week.   Pt also to work on L adductors at home with rolling pin or massage gun to reduce tensile force on area of labial pain. Next visit we will progress her skills with eccentric lengthening of the pelvic floor, pairing this with exercise.   We will also pair the Knack with exercise to improve her maintaining control with exertion and increased abdominal pressure.   Pt will benefit from continued scar tissue release internally after delivery, and external soft tissue work now (HEP seated on ball for TpR) to reduce pain and to prepare for labor.   Today pt demos improved lumbar ROM and hip ROM actively.     Plan:   Continue per plan of care as tolerated. Patient to continue with HEP each day independently.      Current Problem:   1. Encounter for supervision of normal pregnancy in first trimester, unspecified  (HHS-HCC)        2. Hip pain, left        3. Labial pain              Subjective    Pt reports wearing out quickly with HEP PFC. Pt reports R glute pain onset about 5 days.  From pack to glute, rather sharp.   Pain is about the same with L labial into hip pain, most prominent with rolling bed. Putting on pant on L pant leg hurts L hip. No hip joint sounds.     Bladder-  Fluid consumed per day (oz.): 48--> ~90 oz water.  Roughly doubled since eval!  2-3 hours between restroom breaks.   Light colored urine usually. Increased leakage with Knack attempts.     Precautions:  Precautions  Precautions Comment: No falls in last 6 mo    Pain:  Pain Assessment  Pain Assessment: 0-10  0-10 (Numeric) Pain Score: 5 - Moderate pain      Objective      Lumbar ROM (degrees):  Flexion: 100% discomfort in back and glute  Sidebend R/L: 100%/100%    Pelvis  Standing motion testing: Stork test WNL but limited general movement, ilium and PSIS level in standing. Labial pain in SLS.    Not this visit:  Pelvis:  Alignment-  ASIS: R low  Medial Malleolus: R high     Treatments:    Therapeutic Exercise:  Clamshell 2x10  Reverse clamshells 2x10  HS stretch 30s ea  Eccentric lengthening of pelvic floor with palpation and verbal feedback  Diaphragmatic breathing 2 min with Eccentric lengthening on exhale  Pelvic tilting on ball NV  QPP rockbacks  NV    Edu- Discussed checking labrum if persisting past pregnancy    Not this visit:  Pelvic Floor contraction 10x10s NV  Pelvic Floor Quick Flicks 1-2 x 10 reps 2s hold NV    Manual:  Gentle gr 1-2  short axis hip distraction L  STM L adductors    HEP: PF release seated on golfball/ racketball

## 2024-06-22 ENCOUNTER — HOSPITAL ENCOUNTER (OUTPATIENT)
Facility: HOSPITAL | Age: 34
Setting detail: OBSERVATION
LOS: 1 days | Discharge: HOME | End: 2024-06-23
Attending: STUDENT IN AN ORGANIZED HEALTH CARE EDUCATION/TRAINING PROGRAM | Admitting: STUDENT IN AN ORGANIZED HEALTH CARE EDUCATION/TRAINING PROGRAM
Payer: COMMERCIAL

## 2024-06-22 ENCOUNTER — TELEPHONE (OUTPATIENT)
Dept: OBSTETRICS AND GYNECOLOGY | Facility: HOSPITAL | Age: 34
End: 2024-06-22
Payer: COMMERCIAL

## 2024-06-22 PROBLEM — S39.91XA ABDOMINAL TRAUMA, INITIAL ENCOUNTER: Status: ACTIVE | Noted: 2024-06-22

## 2024-06-22 LAB
ABO GROUP (TYPE) IN BLOOD: NORMAL
ANTIBODY SCREEN: NORMAL
ERYTHROCYTE [DISTWIDTH] IN BLOOD BY AUTOMATED COUNT: 12.6 % (ref 11.5–14.5)
HCT VFR BLD AUTO: 36.6 % (ref 36–46)
HGB BLD-MCNC: 12.6 G/DL (ref 12–16)
MCH RBC QN AUTO: 30.9 PG (ref 26–34)
MCHC RBC AUTO-ENTMCNC: 34.4 G/DL (ref 32–36)
MCV RBC AUTO: 90 FL (ref 80–100)
NRBC BLD-RTO: 0 /100 WBCS (ref 0–0)
PLATELET # BLD AUTO: 161 X10*3/UL (ref 150–450)
POC APPEARANCE, URINE: CLEAR
POC BILIRUBIN, URINE: NEGATIVE
POC BLOOD, URINE: NEGATIVE
POC COLOR, URINE: YELLOW
POC GLUCOSE, URINE: NEGATIVE MG/DL
POC KETONES, URINE: ABNORMAL MG/DL
POC LEUKOCYTES, URINE: ABNORMAL
POC NITRITE,URINE: NEGATIVE
POC PH, URINE: 6 PH
POC PROTEIN, URINE: ABNORMAL MG/DL
POC SPECIFIC GRAVITY, URINE: 1.02
POC UROBILINOGEN, URINE: 1 EU/DL
RBC # BLD AUTO: 4.08 X10*6/UL (ref 4–5.2)
RH FACTOR (ANTIGEN D): NORMAL
WBC # BLD AUTO: 8.7 X10*3/UL (ref 4.4–11.3)

## 2024-06-22 PROCEDURE — 81002 URINALYSIS NONAUTO W/O SCOPE: CPT

## 2024-06-22 PROCEDURE — 2500000004 HC RX 250 GENERAL PHARMACY W/ HCPCS (ALT 636 FOR OP/ED): Performed by: STUDENT IN AN ORGANIZED HEALTH CARE EDUCATION/TRAINING PROGRAM

## 2024-06-22 PROCEDURE — 85027 COMPLETE CBC AUTOMATED: CPT | Performed by: STUDENT IN AN ORGANIZED HEALTH CARE EDUCATION/TRAINING PROGRAM

## 2024-06-22 PROCEDURE — 96372 THER/PROPH/DIAG INJ SC/IM: CPT | Performed by: STUDENT IN AN ORGANIZED HEALTH CARE EDUCATION/TRAINING PROGRAM

## 2024-06-22 PROCEDURE — G0378 HOSPITAL OBSERVATION PER HR: HCPCS

## 2024-06-22 PROCEDURE — 59025 FETAL NON-STRESS TEST: CPT

## 2024-06-22 PROCEDURE — 99214 OFFICE O/P EST MOD 30 MIN: CPT

## 2024-06-22 PROCEDURE — 86901 BLOOD TYPING SEROLOGIC RH(D): CPT | Performed by: STUDENT IN AN ORGANIZED HEALTH CARE EDUCATION/TRAINING PROGRAM

## 2024-06-22 PROCEDURE — 36415 COLL VENOUS BLD VENIPUNCTURE: CPT | Performed by: STUDENT IN AN ORGANIZED HEALTH CARE EDUCATION/TRAINING PROGRAM

## 2024-06-22 RX ORDER — HYDRALAZINE HYDROCHLORIDE 20 MG/ML
5 INJECTION INTRAMUSCULAR; INTRAVENOUS ONCE AS NEEDED
Status: DISCONTINUED | OUTPATIENT
Start: 2024-06-22 | End: 2024-06-23 | Stop reason: HOSPADM

## 2024-06-22 RX ORDER — METOCLOPRAMIDE 10 MG/1
10 TABLET ORAL EVERY 6 HOURS PRN
Status: DISCONTINUED | OUTPATIENT
Start: 2024-06-22 | End: 2024-06-23 | Stop reason: HOSPADM

## 2024-06-22 RX ORDER — BISACODYL 10 MG/1
10 SUPPOSITORY RECTAL DAILY PRN
Status: DISCONTINUED | OUTPATIENT
Start: 2024-06-22 | End: 2024-06-23 | Stop reason: HOSPADM

## 2024-06-22 RX ORDER — LABETALOL HYDROCHLORIDE 5 MG/ML
20 INJECTION, SOLUTION INTRAVENOUS ONCE AS NEEDED
Status: DISCONTINUED | OUTPATIENT
Start: 2024-06-22 | End: 2024-06-23 | Stop reason: HOSPADM

## 2024-06-22 RX ORDER — POLYETHYLENE GLYCOL 3350 17 G/17G
17 POWDER, FOR SOLUTION ORAL 2 TIMES DAILY PRN
Status: DISCONTINUED | OUTPATIENT
Start: 2024-06-22 | End: 2024-06-23 | Stop reason: HOSPADM

## 2024-06-22 RX ORDER — ONDANSETRON HYDROCHLORIDE 2 MG/ML
4 INJECTION, SOLUTION INTRAVENOUS EVERY 6 HOURS PRN
Status: DISCONTINUED | OUTPATIENT
Start: 2024-06-22 | End: 2024-06-23 | Stop reason: HOSPADM

## 2024-06-22 RX ORDER — BETAMETHASONE SODIUM PHOSPHATE AND BETAMETHASONE ACETATE 3; 3 MG/ML; MG/ML
12 INJECTION, SUSPENSION INTRA-ARTICULAR; INTRALESIONAL; INTRAMUSCULAR; SOFT TISSUE EVERY 24 HOURS
Status: COMPLETED | OUTPATIENT
Start: 2024-06-22 | End: 2024-06-23

## 2024-06-22 RX ORDER — NIFEDIPINE 10 MG/1
10 CAPSULE ORAL ONCE AS NEEDED
Status: DISCONTINUED | OUTPATIENT
Start: 2024-06-22 | End: 2024-06-23 | Stop reason: HOSPADM

## 2024-06-22 RX ORDER — ASPIRIN 81 MG/1
162 TABLET ORAL DAILY
Status: DISCONTINUED | OUTPATIENT
Start: 2024-06-23 | End: 2024-06-23 | Stop reason: HOSPADM

## 2024-06-22 RX ORDER — ADHESIVE BANDAGE
10 BANDAGE TOPICAL
Status: DISCONTINUED | OUTPATIENT
Start: 2024-06-22 | End: 2024-06-23 | Stop reason: HOSPADM

## 2024-06-22 RX ORDER — SIMETHICONE 80 MG
80 TABLET,CHEWABLE ORAL 4 TIMES DAILY PRN
Status: DISCONTINUED | OUTPATIENT
Start: 2024-06-22 | End: 2024-06-23 | Stop reason: HOSPADM

## 2024-06-22 RX ORDER — ONDANSETRON 4 MG/1
4 TABLET, FILM COATED ORAL EVERY 6 HOURS PRN
Status: DISCONTINUED | OUTPATIENT
Start: 2024-06-22 | End: 2024-06-23 | Stop reason: HOSPADM

## 2024-06-22 RX ORDER — METOCLOPRAMIDE HYDROCHLORIDE 5 MG/ML
10 INJECTION INTRAMUSCULAR; INTRAVENOUS EVERY 6 HOURS PRN
Status: DISCONTINUED | OUTPATIENT
Start: 2024-06-22 | End: 2024-06-23 | Stop reason: HOSPADM

## 2024-06-22 SDOH — SOCIAL STABILITY: SOCIAL INSECURITY: HAS ANYONE EVER THREATENED TO HURT YOUR FAMILY OR YOUR PETS?: NO

## 2024-06-22 SDOH — SOCIAL STABILITY: SOCIAL INSECURITY: ARE YOU OR HAVE YOU BEEN THREATENED OR ABUSED PHYSICALLY, EMOTIONALLY, OR SEXUALLY BY ANYONE?: NO

## 2024-06-22 SDOH — HEALTH STABILITY: MENTAL HEALTH: STRENGTHS (MUST CHOOSE TWO): SUPPORT FROM FRIENDS;SUPPORT FROM FAMILY

## 2024-06-22 SDOH — SOCIAL STABILITY: SOCIAL INSECURITY: PHYSICAL ABUSE: DENIES

## 2024-06-22 SDOH — SOCIAL STABILITY: SOCIAL INSECURITY: DOES ANYONE TRY TO KEEP YOU FROM HAVING/CONTACTING OTHER FRIENDS OR DOING THINGS OUTSIDE YOUR HOME?: NO

## 2024-06-22 SDOH — SOCIAL STABILITY: SOCIAL INSECURITY: VERBAL ABUSE: DENIES

## 2024-06-22 SDOH — SOCIAL STABILITY: SOCIAL INSECURITY: HAVE YOU HAD THOUGHTS OF HARMING ANYONE ELSE?: NO

## 2024-06-22 SDOH — HEALTH STABILITY: MENTAL HEALTH: NON-SPECIFIC ACTIVE SUICIDAL THOUGHTS (PAST 1 MONTH): NO

## 2024-06-22 SDOH — HEALTH STABILITY: MENTAL HEALTH: WISH TO BE DEAD (PAST 1 MONTH): NO

## 2024-06-22 SDOH — SOCIAL STABILITY: SOCIAL INSECURITY: DO YOU FEEL ANYONE HAS EXPLOITED OR TAKEN ADVANTAGE OF YOU FINANCIALLY OR OF YOUR PERSONAL PROPERTY?: NO

## 2024-06-22 SDOH — HEALTH STABILITY: MENTAL HEALTH: SUICIDAL BEHAVIOR (LIFETIME): NO

## 2024-06-22 SDOH — ECONOMIC STABILITY: HOUSING INSECURITY: DO YOU FEEL UNSAFE GOING BACK TO THE PLACE WHERE YOU ARE LIVING?: NO

## 2024-06-22 SDOH — SOCIAL STABILITY: SOCIAL INSECURITY: ARE THERE ANY APPARENT SIGNS OF INJURIES/BEHAVIORS THAT COULD BE RELATED TO ABUSE/NEGLECT?: NO

## 2024-06-22 SDOH — SOCIAL STABILITY: SOCIAL INSECURITY: ABUSE SCREEN: ADULT

## 2024-06-22 SDOH — SOCIAL STABILITY: SOCIAL INSECURITY: HAVE YOU HAD ANY THOUGHTS OF HARMING ANYONE ELSE?: NO

## 2024-06-22 SDOH — HEALTH STABILITY: MENTAL HEALTH: HAVE YOU USED ANY SUBSTANCES (CANABIS, COCAINE, HEROIN, HALLUCINOGENS, INHALANTS, ETC.) IN THE PAST 12 MONTHS?: NO

## 2024-06-22 SDOH — HEALTH STABILITY: MENTAL HEALTH: WERE YOU ABLE TO COMPLETE ALL THE BEHAVIORAL HEALTH SCREENINGS?: YES

## 2024-06-22 SDOH — HEALTH STABILITY: MENTAL HEALTH: HAVE YOU USED ANY PRESCRIPTION DRUGS OTHER THAN PRESCRIBED IN THE PAST 12 MONTHS?: NO

## 2024-06-22 ASSESSMENT — PAIN SCALES - GENERAL
PAINLEVEL_OUTOF10: 0 - NO PAIN
PAINLEVEL_OUTOF10: 0 - NO PAIN
PAINLEVEL_OUTOF10: 3
PAINLEVEL_OUTOF10: 0 - NO PAIN

## 2024-06-22 ASSESSMENT — LIFESTYLE VARIABLES
AUDIT-C TOTAL SCORE: 0
SKIP TO QUESTIONS 9-10: 1
HOW OFTEN DO YOU HAVE A DRINK CONTAINING ALCOHOL: NEVER
HOW OFTEN DO YOU HAVE 6 OR MORE DRINKS ON ONE OCCASION: NEVER
HOW MANY STANDARD DRINKS CONTAINING ALCOHOL DO YOU HAVE ON A TYPICAL DAY: PATIENT DOES NOT DRINK
AUDIT-C TOTAL SCORE: 0

## 2024-06-22 ASSESSMENT — PATIENT HEALTH QUESTIONNAIRE - PHQ9
SUM OF ALL RESPONSES TO PHQ9 QUESTIONS 1 & 2: 0
1. LITTLE INTEREST OR PLEASURE IN DOING THINGS: NOT AT ALL
2. FEELING DOWN, DEPRESSED OR HOPELESS: NOT AT ALL

## 2024-06-22 NOTE — TELEPHONE ENCOUNTER
Contacted via answering service. 33 y.o.  at 33w4d reports fall onto stomach at home after slipping on wet spot on floor. Feeling well currently. Recommend presenting to triage for monitoring given abdominal trauma.

## 2024-06-22 NOTE — H&P
Obstetrical Triage History and Physical     Zulema Oliveira is a 33 y.o.  at 33w4d     Chief Complaint: Fall (Fell and hit abdomen)    Assessment/Plan    Fall @ 1745  - Good fetal movement post fall  - Cat I tracing on prolonged monitoring, no decels  - Hit right side, denies direct abdominal trauma  - Cervix: closed/long/high   - [place] quiet  - RH B+  - Discussed monitoring until 2145    IUP at 33w4d   - NST reactive  - Good fetal movement  - Continue routine prenatal care  - Precautions to return discussed     Maternal Well-being  - Vital signs stable and WNL  - All questions and concerns addressed     Dispo:  - Stable for discharge pending prolonged monitoring       Plan signed out to Dr. Watson.     LARY Irizarry-CNP       Active Problems:  There are no active Hospital Problems.      Pregnancy Problems (from 23 to present)       Problem Noted Resolved    Breech presentation ([facility]) 2024 by Tito Watson MD No    Priority:  Medium      Overview Addendum 2024  1:22 PM by Tito Watson MD     Breech at 30w growth  Discussed likelihood of flipping by 37 w and plan to re-check then  Info on Spinning Babies provided at patient request         Depression during pregnancy in third trimester ([facility]) 3/5/2024 by Samir Arrieta, PhD No    Priority:  Medium      Overview Signed 2024  1:05 PM by Tito Watson MD     Following with behavioral health         Obesity affecting pregnancy, antepartum ([facility]) 2024 by Angi Corona MD No    Priority:  Medium      Overview Signed 2024  1:08 PM by Tito Watson MD     Pregravid BMI: 28.70  No indication for growth/ ANT           Encounter for supervision of normal pregnancy in third trimester ([facility]) 2023 by Tito Watson MD No    Priority:  Medium      Overview Addendum 2024  1:15 PM by Tito Watson MD     Contact info for lactation provided today to allow for  visit antepartum if patient desires  Mild ankle swelling  Some cramping resolved with rest  No other concerns  Reviewed IOL vs spontaneous onset. Open to induction with complications but desires to go into spontaneous labor and avoid pitocin.          Nausea and vomiting in pregnancy prior to 22 weeks gestation ([facility]) 1/15/2024 by Tito Watson MD 2024 by Tito Watson MD    Severe pre-eclampsia in third trimester ([facility]) 2022 by Angi Corona MD 2024 by Angi Corona MD          Subjective   Zulema is here s/p a fall.  Slipped on water and hit right side of abdomen on chair around 1745.  Denies VB, LOF, ctx.  Reports good fetal movement post fall.     Obstetrical History   OB History    Para Term  AB Living   2 1 1 0 0 1   SAB IAB Ectopic Multiple Live Births   0 0 0   1      # Outcome Date GA Lbr Stephon/2nd Weight Sex Delivery Anes PTL Lv   2 Current            1 Term 22 38w2d 19:51 / 01:05 3.31 kg M Vag-Spont EPI N OPHELIA      Birth Comments: IOL for sPEC,        Past Medical History  Past Medical History:   Diagnosis Date    Severe pre-eclampsia in third trimester ([facility]) 2022    Vaginal cyst 2022    Formatting of this note might be different from the original.   Added automatically from request for surgery [id number]        Past Surgical History   Past Surgical History:   Procedure Laterality Date    OTHER SURGICAL HISTORY  2020    No history of surgery       Social History  Social History     Tobacco Use    Smoking status: Never    Smokeless tobacco: Never   Substance Use Topics    Alcohol use: Yes     Comment: social     Substance and Sexual Activity   Drug Use Never       Allergies  Patient has no known allergies.     Medications  Medications Prior to Admission   Medication Sig Dispense Refill Last Dose    aspirin 81 mg EC tablet Take 2 tablets (162 mg) by mouth once daily. 180 tablet 3 2024    prenatal vit 75/iron/folic/om3 (ONE  DAILY PRENATAL ORAL) Take by mouth.   6/21/2024    sennosides-docusate sodium (Matilde-Colace) 8.6-50 mg tablet Take 1 tablet by mouth 2 times a day. (Patient not taking: Reported on 6/22/2024) 180 tablet 3 More than a month       Objective    Last Vitals  Temp Pulse Resp BP MAP O2 Sat   36.1 °C (97 °F) 98 18 134/86   96 %     Physical Examination  Physical Exam  Exam conducted with a chaperone present.   Constitutional:       Appearance: Normal appearance.   HENT:      Head: Normocephalic.   Cardiovascular:      Rate and Rhythm: Normal rate.   Pulmonary:      Effort: Pulmonary effort is normal.   Abdominal:      Comments: Gravid   Genitourinary:     Comments: FHT: 140, mod variability, +accels, -decels   [place]: quiet    Cervix: closed/long/high   Musculoskeletal:         General: Normal range of motion.   Skin:     General: Skin is warm.   Neurological:      Mental Status: She is alert and oriented to person, place, and time.   Psychiatric:         Mood and Affect: Mood normal.         Behavior: Behavior normal.        Lab Review  Labs in chart were reviewed.

## 2024-06-23 VITALS
HEART RATE: 84 BPM | BODY MASS INDEX: 35.1 KG/M2 | WEIGHT: 245.15 LBS | RESPIRATION RATE: 16 BRPM | HEIGHT: 70 IN | DIASTOLIC BLOOD PRESSURE: 81 MMHG | SYSTOLIC BLOOD PRESSURE: 122 MMHG | OXYGEN SATURATION: 97 % | TEMPERATURE: 97.7 F

## 2024-06-23 PROCEDURE — 2500000001 HC RX 250 WO HCPCS SELF ADMINISTERED DRUGS (ALT 637 FOR MEDICARE OP): Performed by: STUDENT IN AN ORGANIZED HEALTH CARE EDUCATION/TRAINING PROGRAM

## 2024-06-23 PROCEDURE — G0378 HOSPITAL OBSERVATION PER HR: HCPCS

## 2024-06-23 PROCEDURE — 96372 THER/PROPH/DIAG INJ SC/IM: CPT | Performed by: STUDENT IN AN ORGANIZED HEALTH CARE EDUCATION/TRAINING PROGRAM

## 2024-06-23 PROCEDURE — 2500000004 HC RX 250 GENERAL PHARMACY W/ HCPCS (ALT 636 FOR OP/ED): Performed by: STUDENT IN AN ORGANIZED HEALTH CARE EDUCATION/TRAINING PROGRAM

## 2024-06-23 PROCEDURE — 99238 HOSP IP/OBS DSCHRG MGMT 30/<: CPT

## 2024-06-23 SDOH — ECONOMIC STABILITY: TRANSPORTATION INSECURITY

## 2024-06-23 SDOH — ECONOMIC STABILITY: FOOD INSECURITY: WITHIN THE PAST 12 MONTHS, THE FOOD YOU BOUGHT JUST DIDN'T LAST AND YOU DIDN'T HAVE MONEY TO GET MORE.: NEVER TRUE

## 2024-06-23 SDOH — ECONOMIC STABILITY: FOOD INSECURITY: WITHIN THE PAST 12 MONTHS, YOU WORRIED THAT YOUR FOOD WOULD RUN OUT BEFORE YOU GOT MONEY TO BUY MORE.: NEVER TRUE

## 2024-06-23 SDOH — ECONOMIC STABILITY: TRANSPORTATION INSECURITY
IN THE PAST 12 MONTHS, HAS THE LACK OF TRANSPORTATION KEPT YOU FROM MEDICAL APPOINTMENTS OR FROM GETTING MEDICATIONS?: NO

## 2024-06-23 SDOH — ECONOMIC STABILITY: FOOD INSECURITY: WITHIN THE PAST 12 MONTHS, YOU WORRIED THAT YOUR FOOD WOULD RUN OUT BEFORE YOU GOT THE MONEY TO BUY MORE.: NEVER TRUE

## 2024-06-23 SDOH — ECONOMIC STABILITY: FOOD INSECURITY

## 2024-06-23 SDOH — ECONOMIC STABILITY: TRANSPORTATION INSECURITY
IN THE PAST 12 MONTHS, HAS LACK OF TRANSPORTATION KEPT YOU FROM MEETINGS, WORK, OR FROM GETTING THINGS NEEDED FOR DAILY LIVING?: NO

## 2024-06-23 SDOH — ECONOMIC STABILITY: FOOD INSECURITY: WITHIN THE PAST 12 MONTHS, THE FOOD YOU BOUGHT JUST DIDN’T LAST AND YOU DIDN’T HAVE MONEY TO GET MORE.: NEVER TRUE

## 2024-06-23 SDOH — ECONOMIC STABILITY: TRANSPORTATION INSECURITY: IN THE PAST 12 MONTHS, HAS LACK OF TRANSPORTATION KEPT YOU FROM MEDICAL APPOINTMENTS OR FROM GETTING MEDICATIONS?: NO

## 2024-06-23 ASSESSMENT — PAIN SCALES - GENERAL

## 2024-06-23 ASSESSMENT — ACTIVITIES OF DAILY LIVING (ADL): LACK_OF_TRANSPORTATION: NO

## 2024-06-23 ASSESSMENT — PAIN - FUNCTIONAL ASSESSMENT: PAIN_FUNCTIONAL_ASSESSMENT: 0-10

## 2024-06-23 NOTE — CARE PLAN
"  Problem: Antepartum  Goal: Maintain pregnancy as long as maternal and/or fetal condition is stable  Outcome: Progressing  Goal: Avoid/minimize constipation  Outcome: Progressing  Goal: No decrease in circulation/VTE  Outcome: Progressing  Goal: FHR remains reassuring  Outcome: Progressing  Goal: Minimize anxiety/maximize coping  Outcome: Progressing     Problem: Fall/Injury  Goal: Not fall by end of shift  Outcome: Met  Goal: Be free from injury by end of the shift  Outcome: Met  Goal: Verbalize understanding of personal risk factors for fall in the hospital  Outcome: Met  Goal: Verbalize understanding of risk factor reduction measures to prevent injury from fall in the home  Outcome: Progressing     The patient's goals for the shift include \"Not to contract and go into labor.\" Goal Met: No evidence of PTL present; occasional mild contractions only.    The clinical goals for the shift include FHT's remain reassuring throughout shift. No s/sx of PTL or abruption. Goal Met: Category 1 FHR tracing throughout shift and no evidence PTL/abruption.    32 y/o  at 33.5 wks here for observation s/p fall. Category 1 FHT's noted throughout shift and no evidence of PTL or abruption present. Occasional mild contractions only. No vaginal bleeding present and patient denies abdominal tenderness. Normotensive BP's throughout shift.    "

## 2024-06-23 NOTE — PROGRESS NOTES
Antepartum Progress Note    Assessment/Plan   Zulema Oliveira is a 33 y.o.  at 33w5d. LLOYD: 2024, by Last Menstrual Period.     Fall @ 1745  - Good fetal movement post fall  - Cat I tracing on prolonged monitoring, no decels  - Hit right side, denies direct abdominal trauma  - Cervix: closed/long/high; no further cramping or contractions overnight and this morning. Recheck deferred   - Govan quiet  - RH B+  - more than 6 ctx over 1 hour, kept for 23 hr obs     IUP at 33w5d   - NST reactive  - Good fetal movement  - 1 dose of BMZ at 2130 ; 2nd dose prior to dc  - Continue routine prenatal care  - Precautions to return discussed      Maternal Well-being  - Vital signs stable and WNL  - All questions and concerns addressed     Dispo: likely discharge at 1645 this evening pending reassuring prolonged monitoring    Discussed with Dr. Kushal Aguirre MD PGY-1         Principal Problem:    Abdominal trauma, initial encounter    Pregnancy Problems (from 23 to present)       Problem Noted Resolved    Breech presentation ([facility]) 2024 by Tito Watson MD No    Priority:  Medium      Overview Addendum 2024  1:22 PM by Tito Watson MD     Breech at 30w growth  Discussed likelihood of flipping by 37 w and plan to re-check then  Info on Spinning Bab[employer] provided at patient request         Depression during pregnancy in third trimester ([facility]) 3/5/2024 by Samir Arrieta, PhD No    Priority:  Medium      Overview Signed 2024  1:05 PM by Tito Watson MD     Following with behavioral health         Obesity affecting pregnancy, antepartum ([facility]) 2024 by Angi Corona MD No    Priority:  Medium      Overview Signed 2024  1:08 PM by Tito Watson MD     Pregravid BMI: 28.70  No indication for growth/ ANT           Encounter for supervision of normal pregnancy in third trimester ([facility]) 2023 by Tito Watson MD No    Priority:   Medium      Overview Addendum 6/17/2024  1:15 PM by Tito Watson MD     Contact info for lactation provided today to allow for visit antepartum if patient desires  Mild ankle swelling  Some cramping resolved with rest  No other concerns  Reviewed IOL vs spontaneous onset. Open to induction with complications but desires to go into spontaneous labor and avoid pitocin.          Nausea and vomiting in pregnancy prior to 22 weeks gestation ([facility]) 1/15/2024 by Tito Watson MD 4/11/2024 by Tito Watson MD    Severe pre-eclampsia in third trimester ([facility]) 6/25/2022 by Angi Corona MD 6/5/2024 by Angi Corona MD            Subjective   Pt feeling well. Cramping resolved. No LOF or VB. Good fetal movement    Objective   Allergies:   Patient has no known allergies.    Last Vitals:  Temp Pulse Resp BP MAP Pulse Ox   37 °C (98.6 °F) 84 18 122/81   97 %     Vitals Min/Max Last 24 Hours:  Temp  Min: 36.1 °C (97 °F)  Max: 37 °C (98.6 °F)  Pulse  Min: 67  Max: 98  Resp  Min: 16  Max: 18  BP  Min: 106/58  Max: 166/93    Intake/Output:     Intake/Output Summary (Last 24 hours) at 6/23/2024 1531  Last data filed at 6/23/2024 0905  Gross per 24 hour   Intake 240 ml   Output --   Net 240 ml       Physical Exam:  Physical Exam  Exam conducted with a chaperone present.   Constitutional:       Appearance: Normal appearance.   HENT:      Head: Normocephalic.   Cardiovascular:      Rate and Rhythm: Normal rate.   Pulmonary:      Effort: Pulmonary effort is normal.   Abdominal:      Comments: Gravid   Genitourinary:     Comments: FHT: 140, mod variability, +accels, -decels   Kistler: quiet     Cervix: closed/long/high   Musculoskeletal:         General: Normal range of motion.   Skin:     General: Skin is warm.   Neurological:      Mental Status: She is alert and oriented to person, place, and time.   Psychiatric:         Mood and Affect: Mood normal.         Behavior: Behavior normal.     Lab Data:  Labs in  chart were reviewed.

## 2024-06-23 NOTE — DISCHARGE SUMMARY
Discharge Summary    Admission Date: 6/22/2024  Discharge Date: 6/23/2024      Discharge Diagnosis  Abdominal trauma, initial encounter    Hospital Course  Patient presented to triage on 6/22 after a fall and hitting the side of her abdomen. No other OB complaints. She had some cramping on presentation and contractions on toco. She was admitted for 23 hour obs. No subsequent cramping or ctx overnight and during the day. No vaginal bleeding or LOF. She had 23 hours of reassuring tracing. She received 2 doses of betamethasone, 1st dose on arrival and 2nd dose prior to discharge.  She was safe and stable for discharge home. She has outpatient follow up scheduled with her OB.    Pertinent Physical Exam At Time of Discharge  Physical Exam  Exam conducted with a chaperone present.   Constitutional:       Appearance: Normal appearance.   HENT:      Head: Normocephalic.   Cardiovascular:      Rate and Rhythm: Normal rate.   Pulmonary:      Effort: Pulmonary effort is normal.   Abdominal:      Comments: Gravid   Genitourinary:     Comments: FHT: 140, mod variability, +accels, -decels   Masury: no ctx     Cervix: closed/long/high   Musculoskeletal:         General: Normal range of motion.   Skin:     General: Skin is warm.   Neurological:      Mental Status: She is alert and oriented to person, place, and time.   Psychiatric:         Mood and Affect: Mood normal.         Behavior: Behavior normal.     Discharge Meds     Your medication list        ASK your doctor about these medications        Instructions Last Dose Given Next Dose Due   aspirin 81 mg EC tablet      Take 2 tablets (162 mg) by mouth once daily.       ONE DAILY PRENATAL ORAL           sennosides-docusate sodium 8.6-50 mg tablet  Commonly known as: Matilde-Colace      Take 1 tablet by mouth 2 times a day.                   Test Results Pending At Discharge  Pending Labs       No current pending labs.            Outpatient Follow-Up  Future Appointments   Date Time  Provider Department [place]   6/26/2024  2:00 PM Samir Arrieta, PhD [id number] [place]   6/28/2024  8:45 AM Samanta Brown, PT [id number] [place]   7/1/2024  9:00 AM Tito Watson MD [id number] [place]   7/9/2024  2:00 PM Samir Arrieta,  [id number] [place]   7/18/2024  9:00 AM Tito Watson MD [id number] [place]   7/25/2024  8:30 AM Tito Watson MD [id number] [place]   7/30/2024  2:00 PM Samir Arrieta, PhD [id number] [place]   8/1/2024  1:45 PM Sally Herman [facility] [id number] [place]   8/8/2024  8:30 AM Tito Watson MD [id number] [place]   8/13/2024  2:00 PM Samir Arrieta, PhD [id number]   9/26/2024  5:00 PM Maggie Mccormack, PT [id number] [place]   10/3/2024  3:00 PM Maggie Mccormack, PT [id number] [place]   10/10/2024  5:00 PM Maggie Mccormack, PT [id number] [place]   10/17/2024  2:00 PM Maggie Mccormack, PT [id number] [place]           Amirah Aguirre MD

## 2024-06-24 ENCOUNTER — PATIENT OUTREACH (OUTPATIENT)
Dept: CARE COORDINATION | Facility: CLINIC | Age: 34
End: 2024-06-24
Payer: COMMERCIAL

## 2024-06-24 NOTE — PROGRESS NOTES
Medications  Medications reviewed with patient/caregiver?: Not applicable (6/24/2024  3:05 PM)  Is the patient taking all medications as directed (includes completed medication regime)?: Yes (6/24/2024  3:05 PM)    Appointments  Does the patient have a primary care provider?: Yes (multiple appts scheduled) (6/24/2024  3:05 PM)  Care Management Interventions: Verified appointment date/time/provider (6/24/2024  3:05 PM)    Patient Teaching  Does the patient have access to their discharge instructions?: Yes (6/24/2024  3:05 PM)  Care Management Interventions: Reviewed instructions with patient (6/24/2024  3:05 PM)  What is the patient's perception of their health status since discharge?: Returned to baseline/stable (6/24/2024  3:05 PM)    Wrap Up  Wrap Up Additional Comments: Zulema reports she is back to baseline and feeling much better, no spotting, no cramping, no fluid leakage.  Has OB appts scheduled. (6/24/2024  3:05 PM)

## 2024-06-26 ENCOUNTER — APPOINTMENT (OUTPATIENT)
Dept: BEHAVIORAL HEALTH | Facility: CLINIC | Age: 34
End: 2024-06-26
Payer: COMMERCIAL

## 2024-06-26 DIAGNOSIS — F41.1 GENERALIZED ANXIETY DISORDER: Primary | Chronic | ICD-10-CM

## 2024-06-26 DIAGNOSIS — F32.A DEPRESSION DURING PREGNANCY IN THIRD TRIMESTER (HHS-HCC): ICD-10-CM

## 2024-06-26 DIAGNOSIS — O99.343 DEPRESSION DURING PREGNANCY IN THIRD TRIMESTER (HHS-HCC): ICD-10-CM

## 2024-06-26 PROCEDURE — 90837 PSYTX W PT 60 MINUTES: CPT | Performed by: PSYCHOLOGIST

## 2024-06-26 NOTE — PROGRESS NOTES
Behavioral Medicine Psychotherapy Progress Note      IDENTIFYING AND REFERRAL INFORMATION:  Zulema Oliveira is a 33 y.o. able-bodied employed,  White female parent of one who is currently pregnant and dealing with exacerbation of depressive symptoms. She presented for follow-up.     Start Time: 2 pm  End Time: 3 pm  Time Spent with Patient: 57 minutes    Session number 7 with this psychologist.    This is a virtual video visit.     Telephone/Televideo Informed Consent for Psychotherapy was reviewed with the patient as needed. The purpose of the meeting was reviewed as indicated, and limits of confidentiality reviewed as needed. Patient stated an understanding of the information and agreed to the session.       Symptoms: anxiety, low mood, crying more easily than usual, feeling inadequate, ruminating on not being a good enough parent, fatigue (some pregnancy related), intermittent guilt.     Focus of treatment: identifying unhelpful responses and modifying them to be more adaptive, self-supportive       Session Content:  She noted she feels better, less tearful, more centered this week. Got more information about her mother's diagnosis and she found it helpful to get some answers and have optimistic feel for her treatment. She also talked with her  about concerns that re-emerged with this diagnosis, about her father-in-law's cancer care and in-law's response as well as her 's response. They talked about it all and she processed her concerns, got more clarity and questions answered. Feels more settled about that.     She wanted to focus more on cognitive distortions/thought errors. She knows that Ignoring the Positive and Negative labeling/Perfectionism are common hang ups and will eat at her at home or at work. She feels like she has unrealistic expectations for herself and gets down about herself. She will be open about it, share how she is feeling, does not shy away form the topic or engage in  "avoidance responses. \"If I don't meet the expectation, then I'm just upset, not good enough.\" She explained that it does not feel like a core belief, maybe a fear that love from others is conditional or fear of her worst case scenario (others choosing to take their love or friendship away from her). She tries to please others to keep them around, has chosen that as her response. She does not like this response. Provider suggested addressing the fear as her first step- consider how likely feared outcome is, if it has ever happened in her marriage or with her best friend before when she made a mistake or upset them, and what situations may make it more likely for them to end the relationship.       Also, Provider validated her emotions, focused on worry management strategies discussed previously and how they could apply here as coping tools for her worries about loss, and explored outlets for emotional support and for comfort.     Additional historical information:   She wants to continue the appreciation journal, as a regular practice of recognizing her appreciation for her efforts in a day (up to 3 examples). She also continues to remind herself of the \"good enough parent\" as a means of normalizing having limits to one's capacity/abilities.    , one child. Son Nakul is two. Pregnant with second. 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     Current Psychiatric Medications:  None noted.     Objective:  Mental Status  Orientation and consciousness: [x ]oriented X 3 and attentive     [ ]other, explain:   Appearance/grooming :    [x ]appropriate [ ]poor grooming/hygiene bizarre appearance    [ ]other, explain:    Behavior: [x ]appropriate to context. Easily teary.   [ ]inappropriate and/or bizarre, explain:    Speech: [x]normal rate/rhythm [ ]pressured speech []slow    [ ]other,    Language: [x ]normal    [ ]abnormalities noted, explain:    Mood: [ ]euthymic [ " ]depressed [ ]dysphoric [ x]anxious    [ ]euphoric [ ] other   Affect: [x]mood congruent. Appropriately teary and sad during session.   []incongruent, explain:    Evidence of perceptual disturbances (hallucinations, illusions, etc.):    [ x]no    [ ]yes, explain:   Thought processes:     [x ]normal and congruent     [ ]other, explain:    Insight: [ ]good [x ]adequate [ ]poor []questionable   Judgment: [ ]good [x ]adequate [ ]poor []questionable   Fund of Knowledge:[ ]above average [x ]average [ ]below average    Risk Assessment  Homicidal intentions: no  Homicidal plan: no  Suicidal intentions: no  Suicidal plan: no  Risk of harm low at this time.       Therapeutic Intervention:   [ x] Psychosocial  [ ] Treatment Goals  [ x] Cognitive/Behavioral- included changing negative self-talk, identifying idstorted thinking, working on A-B-C's exercise (antecedent-behavior-consequence).   [ x] Psychoeducation   [ ] Insight/Psychodynamic  [ ] Problem solving  [ x] Supportive  [ ] Referral to additional/other treatment/resources      Diagnosis:   ERINN  depressive disorder, unspecified, during pregnancy    Treatment Plan/Recommendations:   rtc in about 2 weeks for individual psychotherapy with this psychologist via video telehealth. Patient agreed to appointment frequency and plan. Patient has scheduling contact info to use as needed. Upcoming appointment already scheduled.     Patient to review worksheets sent via email, do fear management exercise discussed today.   Patient to continue appreciation journal daily.     Provider emailed resources- distorted thinking list and an ABC table.       Prognosis: good    Progress to date: some, as noted.       Samir Arrieta, PhD

## 2024-06-28 ENCOUNTER — TREATMENT (OUTPATIENT)
Dept: PHYSICAL THERAPY | Facility: CLINIC | Age: 34
End: 2024-06-28
Payer: COMMERCIAL

## 2024-06-28 DIAGNOSIS — Z34.91 ENCOUNTER FOR SUPERVISION OF NORMAL PREGNANCY IN FIRST TRIMESTER, UNSPECIFIED GRAVIDITY (HHS-HCC): ICD-10-CM

## 2024-06-28 DIAGNOSIS — M25.552 HIP PAIN, LEFT: Primary | ICD-10-CM

## 2024-06-28 DIAGNOSIS — N94.89 LABIAL PAIN: ICD-10-CM

## 2024-06-28 PROCEDURE — 97110 THERAPEUTIC EXERCISES: CPT | Mod: GP

## 2024-06-28 PROCEDURE — 97140 MANUAL THERAPY 1/> REGIONS: CPT | Mod: GP

## 2024-06-28 ASSESSMENT — PAIN SCALES - GENERAL: PAINLEVEL_OUTOF10: 5 - MODERATE PAIN

## 2024-06-28 ASSESSMENT — PAIN - FUNCTIONAL ASSESSMENT: PAIN_FUNCTIONAL_ASSESSMENT: 0-10

## 2024-06-28 NOTE — PROGRESS NOTES
Physical Therapy    Physical Therapy Treatment      Patient Name: Zulema Oliveira  MRN: [id number]  Today's Date: 2024  Time Calculation  Start Time: 0850  Stop Time: 0945  Time Calculation (min): 55 min    Assessment:  Pt demos possible radicular symptoms to R glute and was instructed in Soham method for self management of symptoms, will report effects next week.   Pt also to work on L adductors at home with rolling pin or massage gun to reduce tensile force on area of labial pain. Next visit we will progress her skills with eccentric lengthening of the pelvic floor, pairing this with exercise.   We will also pair the Knack with exercise to improve her maintaining control with exertion and increased abdominal pressure.   Pt will benefit from continued scar tissue release internally after delivery, and external soft tissue work now (HEP seated on ball for TpR) to reduce pain and to prepare for labor.   Today pt demos improved lumbar ROM and hip ROM actively.     Plan:   Continue per plan of care as tolerated. Patient to continue with HEP each day independently.      Current Problem:   1. Hip pain, left        2. Labial pain        3. Encounter for supervision of normal pregnancy in first trimester, unspecified  ([facility])                Subjective    Pt reports Saturday into  was jessica after a fall on her belly and side of R hip.   Still wearing out quickly with HEP PFC.   Sharp shooting through middle of R glute   Pain is about the same with L labial into hip pain    Bladder-  Fluid consumed per day (oz.): 48--> ~90 oz water. Maintaining her progress!  2-3 hours between restroom breaks.     Precautions:  Precautions  Precautions Comment: No falls in last 6 mo    Pain:  Pain Assessment  Pain Assessment: 0-10  0-10 (Numeric) Pain Score: 5 - Moderate pain      Objective   L release at OI  Difficulty relaxing adductors for manual intervention     Treatments:    Therapeutic Exercise:  Donovan  "GTB 2x10  Reverse clamshells 2x10  HS stretch 30s ea  Eccentric lengthening of pelvic floor with palpation and verbal feedback NV  Diaphragmatic breathing 2 min with Eccentric lengthening on exhale  Pelvic tilting on ball NV  QPP rockbacks  NV  Standing sumo squat x10- pickup 5# kettlebell with PG + TA preparatory contraction  Edu- TA function and \"core\" stability before and after pregnancy    Not this visit:  Pelvic Floor contraction 10x10s NV  Pelvic Floor Quick Flicks 1-2 x 10 reps 2s hold NV    Manual:  Gentle gr 1-2  short axis hip distraction L NV  STM L adductors  L piriformis release  L STM glute med, obturator internus     HEP: PF release seated, provided image and a ball to take home  "

## 2024-07-01 ENCOUNTER — APPOINTMENT (OUTPATIENT)
Dept: OBSTETRICS AND GYNECOLOGY | Facility: CLINIC | Age: 34
End: 2024-07-01
Payer: COMMERCIAL

## 2024-07-01 VITALS — DIASTOLIC BLOOD PRESSURE: 58 MMHG | BODY MASS INDEX: 35.3 KG/M2 | SYSTOLIC BLOOD PRESSURE: 110 MMHG | WEIGHT: 246 LBS

## 2024-07-01 DIAGNOSIS — O99.343 DEPRESSION DURING PREGNANCY IN THIRD TRIMESTER (HHS-HCC): ICD-10-CM

## 2024-07-01 DIAGNOSIS — Z34.83 ENCOUNTER FOR SUPERVISION OF OTHER NORMAL PREGNANCY IN THIRD TRIMESTER (HHS-HCC): ICD-10-CM

## 2024-07-01 DIAGNOSIS — O99.210 OBESITY AFFECTING PREGNANCY, ANTEPARTUM, UNSPECIFIED OBESITY TYPE (HHS-HCC): ICD-10-CM

## 2024-07-01 DIAGNOSIS — R10.9 TRIGGER POINT OF ABDOMEN: Primary | ICD-10-CM

## 2024-07-01 DIAGNOSIS — F32.A DEPRESSION DURING PREGNANCY IN THIRD TRIMESTER (HHS-HCC): ICD-10-CM

## 2024-07-01 PROCEDURE — 0501F PRENATAL FLOW SHEET: CPT | Performed by: STUDENT IN AN ORGANIZED HEALTH CARE EDUCATION/TRAINING PROGRAM

## 2024-07-01 NOTE — PROGRESS NOTES
Zulema Oliveira is a 33 y.o. year old  patient at 34w6d presenting for prenatal care.     Assessment/Plan   Problem List Items Addressed This Visit          Pregnancy    Encounter for supervision of normal pregnancy in third trimester ([facility])    Overview     Doing well since fall/hospital visit  No contractions. ?[facility]  No VB, LOF. Good movement  Leopolds today with likely vertex position  Reviewed confirmatory US at next visit and possible management options if baby remains breech  RTC 2 weeks         Depression during pregnancy in third trimester ([facility])    Overview     Following with behavioral health         Obesity affecting pregnancy, antepartum ([facility])    Overview     Pregravid BMI: 28.70  No indication for growth/ ANT           Breech presentation ([facility])    Overview     Breech at 30w growth  Discussed likelihood of flipping by 37 w and plan to re-check then  Info on [employer] Bab[employer] provided at patient request  Feels suzi ho has flipped  Leopold today with            Other    Trigger point of abdomen - Primary    Overview     History of trigger points after last delivery   Following with PT            Tito Watson MD

## 2024-07-09 ENCOUNTER — APPOINTMENT (OUTPATIENT)
Dept: BEHAVIORAL HEALTH | Facility: CLINIC | Age: 34
End: 2024-07-09
Payer: COMMERCIAL

## 2024-07-09 DIAGNOSIS — F41.1 GENERALIZED ANXIETY DISORDER: Primary | Chronic | ICD-10-CM

## 2024-07-09 DIAGNOSIS — F32.A DEPRESSION DURING PREGNANCY IN THIRD TRIMESTER (HHS-HCC): ICD-10-CM

## 2024-07-09 DIAGNOSIS — O99.343 DEPRESSION DURING PREGNANCY IN THIRD TRIMESTER (HHS-HCC): ICD-10-CM

## 2024-07-09 PROCEDURE — 90837 PSYTX W PT 60 MINUTES: CPT | Performed by: PSYCHOLOGIST

## 2024-07-09 NOTE — PROGRESS NOTES
"Behavioral Medicine Psychotherapy Progress Note      IDENTIFYING AND REFERRAL INFORMATION:  Zulema Oliveira is a 33 y.o. able-bodied employed,  White female parent of one who is currently pregnant and dealing with exacerbation of depressive symptoms. She presented for follow-up.     Start Time: 2 pm  End Time: 3 pm  Time Spent with Patient: 52 minutes    Session number 8 with this psychologist.    This is a virtual video visit.     Telephone/Televideo Informed Consent for Psychotherapy was reviewed with the patient as needed. The purpose of the meeting was reviewed as indicated, and limits of confidentiality reviewed as needed. Patient stated an understanding of the information and agreed to the session.       Symptoms: anxiety, low mood, crying more easily than usual, feeling inadequate, ruminating on not being a good enough parent, fatigue (some pregnancy related), intermittent guilt.      Focus of treatment: identifying unhelpful responses and modifying them to be more adaptive, self-supportive       Session Content:  She noted she feels the best she has in months, because she feels like she understands why she has been so hung up on perfectionism and why she cannot \"practice what I preach\" to others. She realized she'd convinced herself that avoiding mistakes was the way to keep people loving her; she also recognized that's not what she believes about love in general and not what she would tell someone else. She feels like she'd been telling herself a falsehood and recognizing this at her last appointment was helpful. She's practicing being more supportive of herself and recognizing when she jumps to conclusions in her thinking, to reframe it and remind herself of what she actually believes or knows to be true. This process has been helpful. Her mood is improved, she is worrying less and she feels a sense of relief.    She noticed she has grieved loss of potential with her mother, because her mother may " "not be able to be involved in the birth or care of her new baby when they're born. Her mom's oncology treatment starts near her due date and patient recognized this will impact her mom's availability. She's made back up plans for childcare and other contingency plans, which she was able to do after she grieved the losses. She said considering specifics of \"what would I do if this happened\" for her feared outcome and a good outcome help her feel more prepared and help process her emotions. The worry doesn't balloon then.     Provider reinforced her gains and reviewed helpful tools. Discussed normalizing mistakes as part of the practice; she said this is a challenge and a work in progress. Provider also suggested she's engaging in identity re-development since having a child and preparing for her second. Also suggested she's grieving loss of her old self as non-parent and her body pre-pregnancy. Questioned if this grief and identity re-development influenced her distorted thinking and sense of urgency to make sure her loved ones stuck around. She thought she'd deferred some of her discomfort about changes to her body and her identity to focus on not wanting to lose more. Provider agreed and again validated experiencing more frequent sadness, irritability, frustration as part of her mix of emotional responses because there is so much transition she's working through.       Additional historical information:   She wants to continue the appreciation journal, as a regular practice of recognizing her appreciation for her efforts in a day (up to 3 examples). She also continues to remind herself of the \"good enough parent\" as a means of normalizing having limits to one's capacity/abilities.    , one child. Son Nakul is two. Pregnant with second. 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     Current Psychiatric Medications:  None noted.     Objective:  Mental " Status  Orientation and consciousness: [x ]oriented X 3 and attentive     [ ]other, explain:   Appearance/grooming :    [x ]appropriate [ ]poor grooming/hygiene bizarre appearance    [ ]other, explain:    Behavior: [x ]appropriate to context. Easily teary.   [ ]inappropriate and/or bizarre, explain:    Speech: [x]normal rate/rhythm [ ]pressured speech []slow    [ ]other,    Language: [x ]normal    [ ]abnormalities noted, explain:    Mood: [ ]euthymic [ ]depressed [ ]dysphoric [ x]anxious    [ ]euphoric [ ] other   Affect: [x]mood congruent. Appropriately teary and sad during session.   []incongruent, explain:    Evidence of perceptual disturbances (hallucinations, illusions, etc.):    [ x]no    [ ]yes, explain:   Thought processes:     [x ]normal and congruent     [ ]other, explain:    Insight: [ ]good [x ]adequate [ ]poor []questionable   Judgment: [ ]good [x ]adequate [ ]poor []questionable   Fund of Knowledge:[ ]above average [x ]average [ ]below average    Risk Assessment  Homicidal intentions: no  Homicidal plan: no  Suicidal intentions: no  Suicidal plan: no  Risk of harm low at this time.       Therapeutic Intervention:   [ x] Psychosocial  [ ] Treatment Goals  [ x] Cognitive/Behavioral- included changing negative self-talk, identifying idstorted thinking, working on A-B-C's exercise (antecedent-behavior-consequence).   [ x] Psychoeducation   [ ] Insight/Psychodynamic  [ ] Problem solving  [ x] Supportive  [ ] Referral to additional/other treatment/resources  Also, Provider validated her emotions, focused on worry management strategies discussed previously and how they could apply here as coping tools for her worries about loss, and explored outlets for emotional support and for comfort.     Diagnosis:   ERINN  depressive disorder, unspecified, during pregnancy    Treatment Plan/Recommendations:   rtc in about 2 weeks for individual psychotherapy with this psychologist via video telehealth. Patient agreed to  appointment frequency and plan. Patient has scheduling contact info to use as needed. Upcoming appointment already scheduled.     Patient to review worksheets sent via email, do fear management exercise discussed today.   Patient to continue appreciation journal daily.     Provider emailed resources- distorted thinking list and an ABC table.       Prognosis: good    Progress to date: some, as noted.       Samir Arrieta, PhD

## 2024-07-18 ENCOUNTER — APPOINTMENT (OUTPATIENT)
Dept: OBSTETRICS AND GYNECOLOGY | Facility: CLINIC | Age: 34
End: 2024-07-18
Payer: COMMERCIAL

## 2024-07-18 VITALS — WEIGHT: 250 LBS | DIASTOLIC BLOOD PRESSURE: 68 MMHG | SYSTOLIC BLOOD PRESSURE: 120 MMHG | BODY MASS INDEX: 35.87 KG/M2

## 2024-07-18 DIAGNOSIS — F32.A DEPRESSION DURING PREGNANCY IN THIRD TRIMESTER (HHS-HCC): ICD-10-CM

## 2024-07-18 DIAGNOSIS — O99.210 OBESITY AFFECTING PREGNANCY, ANTEPARTUM, UNSPECIFIED OBESITY TYPE (HHS-HCC): ICD-10-CM

## 2024-07-18 DIAGNOSIS — Z34.83 ENCOUNTER FOR SUPERVISION OF OTHER NORMAL PREGNANCY IN THIRD TRIMESTER (HHS-HCC): ICD-10-CM

## 2024-07-18 DIAGNOSIS — O99.343 DEPRESSION DURING PREGNANCY IN THIRD TRIMESTER (HHS-HCC): ICD-10-CM

## 2024-07-18 PROCEDURE — 0501F PRENATAL FLOW SHEET: CPT | Performed by: STUDENT IN AN ORGANIZED HEALTH CARE EDUCATION/TRAINING PROGRAM

## 2024-07-18 PROCEDURE — 87081 CULTURE SCREEN ONLY: CPT

## 2024-07-18 NOTE — PROGRESS NOTES
GBS today    Zulema Oliveira is a 33 y.o. year old  patient at 37w2d presenting for prenatal care.     Assessment/Plan   Problem List Items Addressed This Visit          Pregnancy    Encounter for supervision of normal pregnancy in third trimester ([facility])    Overview     Reviewed IOL vs. Awaiting spontaneous labor - desires spontaneous labor  Reviewed contraception options - Desires LNG IUD post placental  Continue to check BP at home, to contact office if >140/>90  Stop aspirin at this time  Bedside US today - Cephalic!  SVE /-3  Ctxns last night, strong. No LOF, VB. Good movement.  GBS today  RTC 1 week         Relevant Orders    Group B Streptococcus (GBS) Prenatal Screen, Culture    Depression during pregnancy in third trimester ([facility])    Overview     Following with behavioral health         Relevant Orders    Group B Streptococcus (GBS) Prenatal Screen, Culture    Obesity affecting pregnancy, antepartum ([facility])    Overview     Pregravid BMI: 28.70  No indication for growth/ ANT           Relevant Orders    Group B Streptococcus (GBS) Prenatal Screen, Culture    RESOLVED: Breech presentation ([facility])    Overview     Breech at 30w growth  Discussed likelihood of flipping by 37 w and plan to re-check then  Info on Spinning Babies provided at patient request  Feels llike linneay has flipped  [place] today with         Relevant Orders    Group B Streptococcus (GBS) Prenatal Screen, Culture       Tito Watson MD

## 2024-07-21 LAB — GP B STREP GENITAL QL CULT: NORMAL

## 2024-07-25 ENCOUNTER — TELEPHONE (OUTPATIENT)
Dept: OBSTETRICS AND GYNECOLOGY | Facility: CLINIC | Age: 34
End: 2024-07-25

## 2024-07-25 ENCOUNTER — APPOINTMENT (OUTPATIENT)
Dept: OBSTETRICS AND GYNECOLOGY | Facility: CLINIC | Age: 34
End: 2024-07-25
Payer: COMMERCIAL

## 2024-07-25 VITALS — BODY MASS INDEX: 35.87 KG/M2 | DIASTOLIC BLOOD PRESSURE: 82 MMHG | SYSTOLIC BLOOD PRESSURE: 120 MMHG | WEIGHT: 250 LBS

## 2024-07-25 DIAGNOSIS — Z34.83 ENCOUNTER FOR SUPERVISION OF OTHER NORMAL PREGNANCY IN THIRD TRIMESTER (HHS-HCC): Primary | ICD-10-CM

## 2024-07-25 DIAGNOSIS — Z34.90 ENCOUNTER FOR INDUCTION OF LABOR (HHS-HCC): Primary | ICD-10-CM

## 2024-07-25 PROCEDURE — 0501F PRENATAL FLOW SHEET: CPT | Performed by: STUDENT IN AN ORGANIZED HEALTH CARE EDUCATION/TRAINING PROGRAM

## 2024-07-25 NOTE — PROGRESS NOTES
Pt wants to be checked     Chaperone declined: Shawnee [place], CM3      Zulema Oliveira is a 33 y.o. year old  patient at 38w2d presenting for prenatal care.     Assessment/Plan   Problem List Items Addressed This Visit          Pregnancy    Encounter for supervision of normal pregnancy in third trimester ([facility]) - Primary    Overview     GBS Neg  Feeling well, occasional contractions but nothing regular. Good FM, no VB or LOF    Offered membrane sweep - reviewed risks/benefits, verbal consent obtained  SVE 3/70/-3  Membranes swept  Vertex  Mom starting Chemo likely week of  - desires IOL appointment by  so she can be there - requested, will notify with final date            Tito Watson MD

## 2024-07-25 NOTE — TELEPHONE ENCOUNTER
----- Message from Tito Watson sent at 7/25/2024  8:37 AM EDT -----  Can you please help me schedule an IOL for this patient between 8/5-8/9? (She would prefer later in the week if possible). She'll be 39.6 on 8/5, elective IOL. 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 Thanks! I'll put in orders once we know.

## 2024-07-25 NOTE — TELEPHONE ENCOUNTER
Pt contacted.      Pt informed IOL has been scheduled for thurs. 8/8/24 at 11:00am.   We will give her IOL letter at next ob visit.

## 2024-07-26 ENCOUNTER — TELEPHONE (OUTPATIENT)
Dept: OBSTETRICS AND GYNECOLOGY | Facility: CLINIC | Age: 34
End: 2024-07-26
Payer: COMMERCIAL

## 2024-07-26 NOTE — TELEPHONE ENCOUNTER
Pt contacted.    Pt informed CNM advises otc hemorrhoid suppositories, ice, tylenol.  Pt should also try tucking hemorrhoid back in when in the shower.  Pt informed can be seen on L&D for further assessment if sx worsen.  Understanding voiced.

## 2024-07-26 NOTE — TELEPHONE ENCOUNTER
Pt contacted.   Has had a hemorrhoid for past 3 days.  C/o last night it got bigger.  It is painful to walk, feels stabbing pain,  It is approx size of nickel to a quarter.  It is flesh colored.    Pt denies any bleeding or constipations issue.  Pt has tried tucks, preparation H and ice pack.  No relief.  Pt anxious about going into labor and hemorrhoid getting even worse.   Pt advised hot tub soaks.  Nurse will make provider in the office aware.

## 2024-07-29 ENCOUNTER — APPOINTMENT (OUTPATIENT)
Dept: OBSTETRICS AND GYNECOLOGY | Facility: CLINIC | Age: 34
End: 2024-07-29
Payer: COMMERCIAL

## 2024-07-30 ENCOUNTER — APPOINTMENT (OUTPATIENT)
Dept: BEHAVIORAL HEALTH | Facility: CLINIC | Age: 34
End: 2024-07-30
Payer: COMMERCIAL

## 2024-08-01 ENCOUNTER — ROUTINE PRENATAL (OUTPATIENT)
Dept: OBSTETRICS AND GYNECOLOGY | Facility: CLINIC | Age: 34
End: 2024-08-01
Payer: COMMERCIAL

## 2024-08-01 ENCOUNTER — APPOINTMENT (OUTPATIENT)
Dept: OBSTETRICS AND GYNECOLOGY | Facility: CLINIC | Age: 34
End: 2024-08-01
Payer: COMMERCIAL

## 2024-08-01 VITALS — SYSTOLIC BLOOD PRESSURE: 122 MMHG | DIASTOLIC BLOOD PRESSURE: 78 MMHG | WEIGHT: 255 LBS | BODY MASS INDEX: 36.59 KG/M2

## 2024-08-01 DIAGNOSIS — F32.A DEPRESSION DURING PREGNANCY IN THIRD TRIMESTER (HHS-HCC): ICD-10-CM

## 2024-08-01 DIAGNOSIS — O99.343 DEPRESSION DURING PREGNANCY IN THIRD TRIMESTER (HHS-HCC): ICD-10-CM

## 2024-08-01 DIAGNOSIS — Z34.83 ENCOUNTER FOR SUPERVISION OF OTHER NORMAL PREGNANCY IN THIRD TRIMESTER (HHS-HCC): Primary | ICD-10-CM

## 2024-08-01 DIAGNOSIS — Z3A.39 39 WEEKS GESTATION OF PREGNANCY (HHS-HCC): ICD-10-CM

## 2024-08-01 DIAGNOSIS — O99.210 OBESITY AFFECTING PREGNANCY, ANTEPARTUM, UNSPECIFIED OBESITY TYPE (HHS-HCC): ICD-10-CM

## 2024-08-01 PROCEDURE — 0501F PRENATAL FLOW SHEET: CPT | Performed by: OBSTETRICS & GYNECOLOGY

## 2024-08-01 NOTE — PROGRESS NOTES
Assessment/Plan   Problem List Items Addressed This Visit             ICD-10-CM       Ob-Gyn Problems    Encounter for supervision of normal pregnancy in third trimester ([facility]) Z34.93    Obesity affecting pregnancy, antepartum ([facility]) O99.210       Other    Depression during pregnancy in third trimester ([facility]) O99.343, F32.A   Membranes sweep today.  Follow up for induction.    Angi Corona MD    Subjective     Mood is ok, considering mom (who has cancer and may start chemo soon) and pregnancy.  Has a hemorrhoid, doing suppository and cream. Much better than last week.  Would like membrane sweep.    Objective   Physical Exam:   Weight: 116 kg (255 lb)  TW.9 kg (55 lb)  Expected Total Weight Gain: 7 kg (15 lb)-11.5 kg (25 lb)   Pregravid BMI: 28.70  BP: 122/78  Fetal Heart Rate: 145

## 2024-08-06 ENCOUNTER — TELEPHONE (OUTPATIENT)
Dept: OBSTETRICS AND GYNECOLOGY | Facility: CLINIC | Age: 34
End: 2024-08-06
Payer: COMMERCIAL

## 2024-08-06 DIAGNOSIS — Z34.91 ENCOUNTER FOR SUPERVISION OF NORMAL PREGNANCY IN FIRST TRIMESTER, UNSPECIFIED GRAVIDITY (HHS-HCC): ICD-10-CM

## 2024-08-06 NOTE — TELEPHONE ENCOUNTER
Reviewed expectations of IOL on arrival to L&D 8/8/24 @ 1100. IV, CRB, cytotec. Pitocin options to be discussed on arrival.

## 2024-08-08 ENCOUNTER — HOSPITAL ENCOUNTER (INPATIENT)
Facility: HOSPITAL | Age: 34
LOS: 3 days | Discharge: HOME | End: 2024-08-11
Attending: OBSTETRICS & GYNECOLOGY | Admitting: STUDENT IN AN ORGANIZED HEALTH CARE EDUCATION/TRAINING PROGRAM
Payer: COMMERCIAL

## 2024-08-08 ENCOUNTER — APPOINTMENT (OUTPATIENT)
Dept: OBSTETRICS AND GYNECOLOGY | Facility: CLINIC | Age: 34
End: 2024-08-08
Payer: COMMERCIAL

## 2024-08-08 ENCOUNTER — ANESTHESIA (OUTPATIENT)
Dept: OBSTETRICS AND GYNECOLOGY | Facility: HOSPITAL | Age: 34
End: 2024-08-08
Payer: COMMERCIAL

## 2024-08-08 ENCOUNTER — ANESTHESIA EVENT (OUTPATIENT)
Dept: OBSTETRICS AND GYNECOLOGY | Facility: HOSPITAL | Age: 34
End: 2024-08-08
Payer: COMMERCIAL

## 2024-08-08 ENCOUNTER — APPOINTMENT (OUTPATIENT)
Dept: OBSTETRICS AND GYNECOLOGY | Facility: HOSPITAL | Age: 34
End: 2024-08-08
Payer: COMMERCIAL

## 2024-08-08 PROBLEM — Z34.90 ENCOUNTER FOR INDUCTION OF LABOR (HHS-HCC): Status: ACTIVE | Noted: 2024-08-08

## 2024-08-08 LAB
ABO GROUP (TYPE) IN BLOOD: NORMAL
ALBUMIN SERPL BCP-MCNC: 3.3 G/DL (ref 3.4–5)
ALP SERPL-CCNC: 65 U/L (ref 33–110)
ALT SERPL W P-5'-P-CCNC: 8 U/L (ref 7–45)
ANION GAP SERPL CALC-SCNC: 18 MMOL/L (ref 10–20)
ANTIBODY SCREEN: NORMAL
AST SERPL W P-5'-P-CCNC: 15 U/L (ref 9–39)
BILIRUB SERPL-MCNC: 0.4 MG/DL (ref 0–1.2)
BUN SERPL-MCNC: 12 MG/DL (ref 6–23)
CALCIUM SERPL-MCNC: 8.9 MG/DL (ref 8.6–10.6)
CHLORIDE SERPL-SCNC: 106 MMOL/L (ref 98–107)
CO2 SERPL-SCNC: 16 MMOL/L (ref 21–32)
CREAT SERPL-MCNC: 0.56 MG/DL (ref 0.5–1.05)
CREAT UR-MCNC: 64.6 MG/DL (ref 20–320)
EGFRCR SERPLBLD CKD-EPI 2021: >90 ML/MIN/1.73M*2
ERYTHROCYTE [DISTWIDTH] IN BLOOD BY AUTOMATED COUNT: 13.2 % (ref 11.5–14.5)
GLUCOSE SERPL-MCNC: 144 MG/DL (ref 74–99)
HCT VFR BLD AUTO: 39.8 % (ref 36–46)
HGB BLD-MCNC: 12.7 G/DL (ref 12–16)
LDH SERPL L TO P-CCNC: 220 U/L (ref 84–246)
MCH RBC QN AUTO: 31.2 PG (ref 26–34)
MCHC RBC AUTO-ENTMCNC: 31.9 G/DL (ref 32–36)
MCV RBC AUTO: 98 FL (ref 80–100)
NRBC BLD-RTO: 0 /100 WBCS (ref 0–0)
PLATELET # BLD AUTO: 147 X10*3/UL (ref 150–450)
POTASSIUM SERPL-SCNC: 4.1 MMOL/L (ref 3.5–5.3)
PROT SERPL-MCNC: 5.9 G/DL (ref 6.4–8.2)
PROT UR-ACNC: 14 MG/DL (ref 5–24)
PROT/CREAT UR: 0.22 MG/MG CREAT (ref 0–0.17)
RBC # BLD AUTO: 4.07 X10*6/UL (ref 4–5.2)
RH FACTOR (ANTIGEN D): NORMAL
SODIUM SERPL-SCNC: 136 MMOL/L (ref 136–145)
TREPONEMA PALLIDUM IGG+IGM AB [PRESENCE] IN SERUM OR PLASMA BY IMMUNOASSAY: NONREACTIVE
WBC # BLD AUTO: 7.9 X10*3/UL (ref 4.4–11.3)

## 2024-08-08 PROCEDURE — 2500000004 HC RX 250 GENERAL PHARMACY W/ HCPCS (ALT 636 FOR OP/ED)

## 2024-08-08 PROCEDURE — 86850 RBC ANTIBODY SCREEN: CPT

## 2024-08-08 PROCEDURE — 86780 TREPONEMA PALLIDUM: CPT

## 2024-08-08 PROCEDURE — 3700000014 EPIDURAL BLOCK: Mod: GC

## 2024-08-08 PROCEDURE — 7210000002 HC LABOR PER HOUR

## 2024-08-08 PROCEDURE — 1120000001 HC OB PRIVATE ROOM DAILY

## 2024-08-08 PROCEDURE — 01967 NEURAXL LBR ANES VAG DLVR: CPT | Performed by: STUDENT IN AN ORGANIZED HEALTH CARE EDUCATION/TRAINING PROGRAM

## 2024-08-08 PROCEDURE — 36415 COLL VENOUS BLD VENIPUNCTURE: CPT

## 2024-08-08 PROCEDURE — 10907ZC DRAINAGE OF AMNIOTIC FLUID, THERAPEUTIC FROM PRODUCTS OF CONCEPTION, VIA NATURAL OR ARTIFICIAL OPENING: ICD-10-PCS | Performed by: SPECIALIST

## 2024-08-08 PROCEDURE — 82570 ASSAY OF URINE CREATININE: CPT

## 2024-08-08 PROCEDURE — 2500000005 HC RX 250 GENERAL PHARMACY W/O HCPCS

## 2024-08-08 PROCEDURE — 3E033VJ INTRODUCTION OF OTHER HORMONE INTO PERIPHERAL VEIN, PERCUTANEOUS APPROACH: ICD-10-PCS | Performed by: STUDENT IN AN ORGANIZED HEALTH CARE EDUCATION/TRAINING PROGRAM

## 2024-08-08 PROCEDURE — 2720000007 HC OR 272 NO HCPCS

## 2024-08-08 PROCEDURE — 85027 COMPLETE CBC AUTOMATED: CPT

## 2024-08-08 PROCEDURE — 86901 BLOOD TYPING SEROLOGIC RH(D): CPT

## 2024-08-08 PROCEDURE — 59050 FETAL MONITOR W/REPORT: CPT

## 2024-08-08 PROCEDURE — 83615 LACTATE (LD) (LDH) ENZYME: CPT

## 2024-08-08 PROCEDURE — 80053 COMPREHEN METABOLIC PANEL: CPT

## 2024-08-08 RX ORDER — OXYTOCIN 10 [USP'U]/ML
10 INJECTION, SOLUTION INTRAMUSCULAR; INTRAVENOUS ONCE AS NEEDED
Status: DISCONTINUED | OUTPATIENT
Start: 2024-08-08 | End: 2024-08-09

## 2024-08-08 RX ORDER — TERBUTALINE SULFATE 1 MG/ML
0.25 INJECTION SUBCUTANEOUS ONCE AS NEEDED
Status: DISCONTINUED | OUTPATIENT
Start: 2024-08-08 | End: 2024-08-09

## 2024-08-08 RX ORDER — OXYTOCIN/0.9 % SODIUM CHLORIDE 30/500 ML
2-30 PLASTIC BAG, INJECTION (ML) INTRAVENOUS CONTINUOUS
Status: DISCONTINUED | OUTPATIENT
Start: 2024-08-08 | End: 2024-08-09

## 2024-08-08 RX ORDER — ONDANSETRON 4 MG/1
4 TABLET, FILM COATED ORAL EVERY 6 HOURS PRN
Status: DISCONTINUED | OUTPATIENT
Start: 2024-08-08 | End: 2024-08-09

## 2024-08-08 RX ORDER — LABETALOL HYDROCHLORIDE 5 MG/ML
20 INJECTION, SOLUTION INTRAVENOUS ONCE AS NEEDED
Status: DISCONTINUED | OUTPATIENT
Start: 2024-08-08 | End: 2024-08-09

## 2024-08-08 RX ORDER — NIFEDIPINE 10 MG/1
10 CAPSULE ORAL ONCE AS NEEDED
Status: DISCONTINUED | OUTPATIENT
Start: 2024-08-08 | End: 2024-08-09

## 2024-08-08 RX ORDER — HYDRALAZINE HYDROCHLORIDE 20 MG/ML
5 INJECTION INTRAMUSCULAR; INTRAVENOUS ONCE AS NEEDED
Status: DISCONTINUED | OUTPATIENT
Start: 2024-08-08 | End: 2024-08-09

## 2024-08-08 RX ORDER — OXYTOCIN/0.9 % SODIUM CHLORIDE 30/500 ML
60 PLASTIC BAG, INJECTION (ML) INTRAVENOUS ONCE AS NEEDED
Status: DISCONTINUED | OUTPATIENT
Start: 2024-08-08 | End: 2024-08-09

## 2024-08-08 RX ORDER — LIDOCAINE HYDROCHLORIDE 10 MG/ML
30 INJECTION INFILTRATION; PERINEURAL ONCE AS NEEDED
Status: DISCONTINUED | OUTPATIENT
Start: 2024-08-08 | End: 2024-08-09

## 2024-08-08 RX ORDER — METHYLERGONOVINE MALEATE 0.2 MG/ML
0.2 INJECTION INTRAVENOUS ONCE AS NEEDED
Status: DISCONTINUED | OUTPATIENT
Start: 2024-08-08 | End: 2024-08-09

## 2024-08-08 RX ORDER — SODIUM CHLORIDE, SODIUM LACTATE, POTASSIUM CHLORIDE, CALCIUM CHLORIDE 600; 310; 30; 20 MG/100ML; MG/100ML; MG/100ML; MG/100ML
125 INJECTION, SOLUTION INTRAVENOUS CONTINUOUS
Status: DISCONTINUED | OUTPATIENT
Start: 2024-08-08 | End: 2024-08-09

## 2024-08-08 RX ORDER — ONDANSETRON HYDROCHLORIDE 2 MG/ML
4 INJECTION, SOLUTION INTRAVENOUS EVERY 6 HOURS PRN
Status: DISCONTINUED | OUTPATIENT
Start: 2024-08-08 | End: 2024-08-09

## 2024-08-08 RX ORDER — METOCLOPRAMIDE HYDROCHLORIDE 5 MG/ML
10 INJECTION INTRAMUSCULAR; INTRAVENOUS EVERY 6 HOURS PRN
Status: DISCONTINUED | OUTPATIENT
Start: 2024-08-08 | End: 2024-08-09

## 2024-08-08 RX ORDER — FENTANYL/BUPIVACAINE/NS/PF 2MCG/ML-.1
PLASTIC BAG, INJECTION (ML) INJECTION AS NEEDED
Status: DISCONTINUED | OUTPATIENT
Start: 2024-08-08 | End: 2024-08-09

## 2024-08-08 RX ORDER — CARBOPROST TROMETHAMINE 250 UG/ML
250 INJECTION, SOLUTION INTRAMUSCULAR ONCE AS NEEDED
Status: DISCONTINUED | OUTPATIENT
Start: 2024-08-08 | End: 2024-08-09

## 2024-08-08 RX ORDER — FENTANYL/BUPIVACAINE/NS/PF 2MCG/ML-.1
0-54 PLASTIC BAG, INJECTION (ML) INJECTION CONTINUOUS
Status: DISCONTINUED | OUTPATIENT
Start: 2024-08-08 | End: 2024-08-09

## 2024-08-08 RX ORDER — TRANEXAMIC ACID 100 MG/ML
1000 INJECTION, SOLUTION INTRAVENOUS ONCE AS NEEDED
Status: DISCONTINUED | OUTPATIENT
Start: 2024-08-08 | End: 2024-08-09

## 2024-08-08 RX ORDER — LOPERAMIDE HYDROCHLORIDE 2 MG/1
4 CAPSULE ORAL EVERY 2 HOUR PRN
Status: DISCONTINUED | OUTPATIENT
Start: 2024-08-08 | End: 2024-08-09

## 2024-08-08 RX ORDER — METOCLOPRAMIDE 10 MG/1
10 TABLET ORAL EVERY 6 HOURS PRN
Status: DISCONTINUED | OUTPATIENT
Start: 2024-08-08 | End: 2024-08-09

## 2024-08-08 RX ORDER — MISOPROSTOL 200 UG/1
800 TABLET ORAL ONCE AS NEEDED
Status: DISCONTINUED | OUTPATIENT
Start: 2024-08-08 | End: 2024-08-09

## 2024-08-08 RX ADMIN — Medication 2 MILLI-UNITS/MIN: at 14:28

## 2024-08-08 RX ADMIN — SODIUM CHLORIDE, POTASSIUM CHLORIDE, SODIUM LACTATE AND CALCIUM CHLORIDE 125 ML/HR: 600; 310; 30; 20 INJECTION, SOLUTION INTRAVENOUS at 14:27

## 2024-08-08 RX ADMIN — SODIUM CHLORIDE, POTASSIUM CHLORIDE, SODIUM LACTATE AND CALCIUM CHLORIDE 500 ML: 600; 310; 30; 20 INJECTION, SOLUTION INTRAVENOUS at 21:09

## 2024-08-08 SDOH — SOCIAL STABILITY: SOCIAL INSECURITY: ARE THERE ANY APPARENT SIGNS OF INJURIES/BEHAVIORS THAT COULD BE RELATED TO ABUSE/NEGLECT?: NO

## 2024-08-08 SDOH — HEALTH STABILITY: MENTAL HEALTH: HAVE YOU USED ANY SUBSTANCES (CANABIS, COCAINE, HEROIN, HALLUCINOGENS, INHALANTS, ETC.) IN THE PAST 12 MONTHS?: NO

## 2024-08-08 SDOH — HEALTH STABILITY: MENTAL HEALTH: WISH TO BE DEAD (PAST 1 MONTH): NO

## 2024-08-08 SDOH — SOCIAL STABILITY: SOCIAL INSECURITY: DO YOU FEEL ANYONE HAS EXPLOITED OR TAKEN ADVANTAGE OF YOU FINANCIALLY OR OF YOUR PERSONAL PROPERTY?: NO

## 2024-08-08 SDOH — SOCIAL STABILITY: SOCIAL INSECURITY: ABUSE SCREEN: ADULT

## 2024-08-08 SDOH — SOCIAL STABILITY: SOCIAL INSECURITY: HAVE YOU HAD ANY THOUGHTS OF HARMING ANYONE ELSE?: NO

## 2024-08-08 SDOH — SOCIAL STABILITY: SOCIAL INSECURITY: DOES ANYONE TRY TO KEEP YOU FROM HAVING/CONTACTING OTHER FRIENDS OR DOING THINGS OUTSIDE YOUR HOME?: NO

## 2024-08-08 SDOH — SOCIAL STABILITY: SOCIAL INSECURITY: VERBAL ABUSE: DENIES

## 2024-08-08 SDOH — SOCIAL STABILITY: SOCIAL INSECURITY: PHYSICAL ABUSE: DENIES

## 2024-08-08 SDOH — HEALTH STABILITY: MENTAL HEALTH: HAVE YOU USED ANY PRESCRIPTION DRUGS OTHER THAN PRESCRIBED IN THE PAST 12 MONTHS?: NO

## 2024-08-08 SDOH — SOCIAL STABILITY: SOCIAL INSECURITY: HAS ANYONE EVER THREATENED TO HURT YOUR FAMILY OR YOUR PETS?: NO

## 2024-08-08 SDOH — ECONOMIC STABILITY: HOUSING INSECURITY: DO YOU FEEL UNSAFE GOING BACK TO THE PLACE WHERE YOU ARE LIVING?: NO

## 2024-08-08 SDOH — HEALTH STABILITY: MENTAL HEALTH: WERE YOU ABLE TO COMPLETE ALL THE BEHAVIORAL HEALTH SCREENINGS?: YES

## 2024-08-08 SDOH — HEALTH STABILITY: MENTAL HEALTH: NON-SPECIFIC ACTIVE SUICIDAL THOUGHTS (PAST 1 MONTH): NO

## 2024-08-08 SDOH — SOCIAL STABILITY: SOCIAL INSECURITY: HAVE YOU HAD THOUGHTS OF HARMING ANYONE ELSE?: NO

## 2024-08-08 SDOH — SOCIAL STABILITY: SOCIAL INSECURITY: ARE YOU OR HAVE YOU BEEN THREATENED OR ABUSED PHYSICALLY, EMOTIONALLY, OR SEXUALLY BY ANYONE?: NO

## 2024-08-08 SDOH — HEALTH STABILITY: MENTAL HEALTH: SUICIDAL BEHAVIOR (LIFETIME): NO

## 2024-08-08 ASSESSMENT — PAIN SCALES - GENERAL
PAINLEVEL_OUTOF10: 0 - NO PAIN
PAINLEVEL_OUTOF10: 0 - NO PAIN
PAINLEVEL_OUTOF10: 8
PAINLEVEL_OUTOF10: 0 - NO PAIN

## 2024-08-08 ASSESSMENT — LIFESTYLE VARIABLES
AUDIT-C TOTAL SCORE: 0
HOW OFTEN DO YOU HAVE 6 OR MORE DRINKS ON ONE OCCASION: NEVER
HOW MANY STANDARD DRINKS CONTAINING ALCOHOL DO YOU HAVE ON A TYPICAL DAY: PATIENT DOES NOT DRINK
HOW OFTEN DO YOU HAVE A DRINK CONTAINING ALCOHOL: NEVER
SKIP TO QUESTIONS 9-10: 1
AUDIT-C TOTAL SCORE: 0

## 2024-08-08 ASSESSMENT — ACTIVITIES OF DAILY LIVING (ADL): LACK_OF_TRANSPORTATION: NO

## 2024-08-08 NOTE — SIGNIFICANT EVENT
"Labor Progress Note    Subjective: Patient endorses feeling well. She denies any concerns or questions at this time.     Objective:  Vitals: /80   Pulse 66   Temp 36.3 °C (97.3 °F)   Resp 18   Ht 1.778 m (5' 10\")   Wt 117 kg (257 lb 0.9 oz)   LMP 10/31/2023   SpO2 99%   BMI 36.88 kg/m²   SVE: 4/70/-3   FHT: 140/mod/+accel/-decels  [place]: Q2    A/P:  Continue pitocin per protocol, pitocin currently at 6  CEFM, currently Category I  Epidural as requested- patient would prefer epidural prior to AROM    Seen and discussed with Dr. Brown and Dr. Earline Durbin [facility],   OB/GYN     Patient seen and evaluated with medical student. Agree with assessment above,  Endy Brown MD PGY-4  "

## 2024-08-08 NOTE — SIGNIFICANT EVENT
"Labor Progress Note    Subjective: Nurse made providers aware of another mild range BP @ 1531 (137/92). Patient endorses feeling well at this time. She denies any HA, vision change, RUQ pain, or swelling. She is endorsing feeling some more cramping with contractions.     Objective:  Vitals: BP (!) 137/92 Comment: LUI Last notified of mild range BP  Pulse 77   Temp 37 °C (98.6 °F) (Temporal)   Resp 18   Ht 1.778 m (5' 10\")   Wt 117 kg (257 lb 0.9 oz)   LMP 10/31/2023   SpO2 97%   BMI 36.88 kg/m²   SVE: deferred, last exam on 7/25: 3/70/-3  FHT: 140/mod/+accel/-decels  [place]: Q5-6    A/P:  Discussed with patient dx of gestational HTN, discussed that HELLP labs were negative and will continue to trend BP- patient expressed understanding   Continue pitocin per protocol, pitocin currently on 6  CEFM, currently Category I  Epidural as requested    Discussed with Dr. Brown and Dr. Carline Villanueva, M4  OB/GYN     Plan of care d/w medical student. Agree with assessment above, edits made within text.     Endy Brown MD PGY-4  "

## 2024-08-08 NOTE — H&P
Obstetrical Admission History and Physical    Assessment/Plan    Zulema Oliveira is a 33 y.o.  at 40w2d, LLOYD: 2024, by LMP c/w 13 wk US admitted for rrIOL    IOL  Admitted, consented, scanned, cephalic  Last SVE in office on  was 3/70/-3  Will induce with pitocin and AROM as needed  Epidural as requested  GBS negative  EFW 1874 g (86%), AC 92%  US on --> Leopolds to 7.5-8 lbs (previous baby=7.5 lbs, she states this baby feels larger)  CEFM, currently Cat I  Delivery plan: patient desires vaginal delivery, patient counseled on risks of labor, vaginal delivery, and possibility of C/S for varying maternal or fetal indications    Hypertensive disorder, potential gHTN vs PEC  HELLP labs on admission  /94 on admission    Pregnancy notables:  Hx of sPEC dx @ 38 weeks in prior pregnancy  BMI 36.59    Postpartum planning  Contraception: ppIUD  Feeding: breastfeeding, desires lactation consultation     Samanta Villanueva, M4     Patient seen and evaluated with medical student. Agree with assessment above, edits made within text.    Seen and discussed with Dr. Earline Blake MD, PGY-1      Subjective   Patient endorses feeling well. She is nervous and excited today for her induction. She endorses some mild bloody show a few days ago with no vaginal bleeding since then. She denies any LOF. She endorses some intermittent contractions this AM, spaced 15 minutes apart, none reported since then. She endorses losing her mucous plug around 10 days ago. She endorses good fetal movement. She denies any HA, dizziness, RUQ pain, vision changes.     Baby is a surprise - dad to announce at delivery.       Obstetrical History   OB History    Para Term  AB Living   2 1 1 0 0 1   SAB IAB Ectopic Multiple Live Births   0 0 0   1      # Outcome Date GA Lbr Stephon/2nd Weight Sex Type Anes PTL Lv   2 Current            1 Term 22 38w2d 19:51 / 01:05 3.31 kg M Vag-Spont EPI N OPHELIA       Birth Comments: IOL for sPEC,       Complications: Preeclampsia, severe ([facility])       Past Medical History  Past Medical History:   Diagnosis Date    Severe pre-eclampsia in third trimester ([facility]) 2022    Vaginal cyst 2022    Formatting of this note might be different from the original.   Added automatically from request for surgery [id number]   - depression     Past Surgical History   Past Surgical History:   Procedure Laterality Date    OTHER SURGICAL HISTORY  2020    No history of surgery       Social History  Social History     Tobacco Use    Smoking status: Never    Smokeless tobacco: Never   Substance Use Topics    Alcohol use: Yes     Comment: social     Substance and Sexual Activity   Drug Use Never       Allergies  Patient has no known allergies.     Medications  Medications Prior to Admission   Medication Sig Dispense Refill Last Dose    aspirin 81 mg EC tablet Take 2 tablets (162 mg) by mouth once daily. 180 tablet 3 Past Month    prenatal vit 75/iron/folic/om3 (ONE DAILY PRENATAL ORAL) Take by mouth.   2024    sennosides-docusate sodium (Matilde-Colace) 8.6-50 mg tablet Take 1 tablet by mouth 2 times a day. 180 tablet 3 Past Week       Objective    Last Vitals  Temp Pulse Resp BP MAP O2 Sat   36.5 °C (97.7 °F) 93 16 (!) 142/94   96 %     Physical Examination  General: no acute distress  HEENT: normocephalic, atraumatic  Heart: warm and well perfused, normal rate and rhythm  Lungs: breathing comfortably on room air, CTAB  Abdomen: gravid  Extremities: moving all extremities, no calf tenderness or edema  Neuro: awake and conversant  Psych: appropriate mood and affect  SVE: deferred, last exam on  was 3/70/-3  FHT: 130/mod/+accel/-decel  [place]: irritable  BSUS: cephalic    Lab Review  Labs in chart have been reviewed.

## 2024-08-08 NOTE — ANESTHESIA PREPROCEDURE EVALUATION
Patient: Zulema Oliveira    Evaluation Method: In-person visit    Procedure Information    Date: 08/08/24  Procedure: Labor Consult         Relevant Problems   Anesthesia (within normal limits)      Cardiac (within normal limits)      Pulmonary (within normal limits)      Neuro   (+) Depression during pregnancy in third trimester ([facility])   (+) Generalized anxiety disorder      GI (within normal limits)  Heartburn (pregnancy related)      /Renal (within normal limits)      Liver (within normal limits)      Endocrine   (+) Obesity affecting pregnancy, antepartum ([facility])      Hematology (within normal limits)      GYN   (+) Encounter for supervision of normal pregnancy in third trimester ([facility])       Clinical information reviewed:   Tobacco  Allergies  Meds   Med Hx  Surg Hx   Fam Hx  Soc Hx        NPO Detail:  No data recorded     OB/Gyn Evaluation    Present Pregnancy    Patient is pregnant now.   Obstetric History    (+) hypertensive disorder of pregnancy - preeclampsia              Physical Exam    Airway  Mallampati: I  TM distance: >3 FB  Neck ROM: full     Cardiovascular - normal exam     Dental - normal exam     Pulmonary - normal exam     Abdominal            Anesthesia Plan    History of general anesthesia?: yes  History of complications of general anesthesia?: no    ASA 2     epidural     Use of blood products discussed with patient who.    Plan discussed with resident.

## 2024-08-08 NOTE — H&P
Obstetrical Admission History and Physical    Assessment/Plan    Zulema Oliveira is a 33 y.o.  at 40w2d, LLOYD: 2024, by LMP c/w 13 wk US admitted for rr IOL    Term IOL  Admitted, consented, scanned, cephalic  Will induce with pitocin  Epidural as requested  GBS negative  EFW 1874 g (86%), AC 92%  US on --> Leop[place] to 7.5-8 lbs (previous baby=7.5 lbs, she states this baby feels larger)  CEFM, currently Cat I  Delivery plan: patient desires vaginal delivery, patient counseled on risks of labor, vaginal delivery, and possibility of C/S for varying maternal or fetal indications    Hypertensive disorder, potential gHTN vs PEC  HELLP labs negative x1 (P:C ratio 0.22)  /94 on admission    Pregnancy notables:   Hx of sPEC dx @ 38 weeks in prior pregnancy  BMI 36.59    Postpartum planning:  Contraception: Lng ppIUD  Feeding: breastfeeding, desires lactation consultation     Patient seen and discussed with Dr. Kevin Villanueva, M4  OBGYN    Subjective   Patient endorses feeling well. She is nervous and excited today for her induction. She endorses some mild bloody show a few days ago with no vaginal bleeding since then. She denies any LOF. She endorses some intermittent contractions this AM, spaced 15 minutes apart, none reported since then. She endorses losing her mucous plug around 10 days ago. She endorses good fetal movement. She denies any HA, dizziness, RUQ pain, vision changes.     Baby is a surprise- dad to announce at delivery.     Pregnancy notable for:  Medical Problems       Problem List       * (Principal) Encounter for induction of labor ([facility])    Palpitations    Overview Signed 3/11/2024  9:31 AM by Tito Watson MD     TTE wnl, EF 55-60%          History of pre-eclampsia    Overview Addendum 2024  9:13 AM by Angi Corona MD     Induced at 38 weeks.   Taking asa.  Monitoring BP.         Encounter for supervision of normal pregnancy in third trimester ([facility])     Overview Addendum 2024  8:43 AM by Tito Watson MD     GBS Neg  Feeling well, occasional contractions but nothing regular. Good FM, no VB or LOF    Offered membrane sweep - reviewed risks/benefits, verbal consent obtained  SVE 3/70/-3  Membranes swept  Vertex  Mom starting Chemo likely week of  - desires IOL appointment by  so she can be there - requested, will notify with final date         Trigger point of abdomen    Overview Addendum 2024  1:05 PM by Tito Watson MD     History of trigger points after last delivery   Following with PT         Shortness of breath    Depression during pregnancy in third trimester ([facility])    Overview Signed 2024  1:05 PM by Tito Watson MD     Following with behavioral health         Generalized anxiety disorder (Chronic)    Slow transit constipation    Obesity affecting pregnancy, antepartum ([facility])    Overview Signed 2024  1:08 PM by Tito Watson MD     Pregravid BMI: 28.70  No indication for growth/ ANT           Hip pain, left    Labial pain    Abdominal trauma, initial encounter    Overview Signed 2024  8:38 PM by Tito Watson MD     Fell at 33.4 admitted for 23h obs  BMZ given               Obstetrical History   OB History    Para Term  AB Living   2 1 1 0 0 1   SAB IAB Ectopic Multiple Live Births   0 0 0   1      # Outcome Date GA Lbr Stephon/2nd Weight Sex Type Anes PTL Lv   2 Current            1 Term 22 38w2d 19:51 / 01:05 3.31 kg M Vag-Spont EPI N OPHELIA      Birth Comments: IOL for sPEC,       Complications: Preeclampsia, severe ([facility])       Past Medical History  Past Medical History:   Diagnosis Date    Severe pre-eclampsia in third trimester ([facility]) 2022    Vaginal cyst 2022    Formatting of this note might be different from the original.   Added automatically from request for surgery [id number]   - depression     Past Surgical History   Past Surgical  History:   Procedure Laterality Date    OTHER SURGICAL HISTORY  07/13/2020    No history of surgery       Social History  Social History     Tobacco Use    Smoking status: Never    Smokeless tobacco: Never   Substance Use Topics    Alcohol use: Yes     Comment: social     Substance and Sexual Activity   Drug Use Never       Allergies  Patient has no known allergies.     Medications  Medications Prior to Admission   Medication Sig Dispense Refill Last Dose    aspirin 81 mg EC tablet Take 2 tablets (162 mg) by mouth once daily. 180 tablet 3 Past Month    prenatal vit 75/iron/folic/om3 (ONE DAILY PRENATAL ORAL) Take by mouth.   8/7/2024    sennosides-docusate sodium (Matilde-Colace) 8.6-50 mg tablet Take 1 tablet by mouth 2 times a day. 180 tablet 3 Past Week       Objective    Last Vitals  Temp Pulse Resp BP MAP O2 Sat   37 °C (98.6 °F) 83 16 137/88   96 %     Physical Examination  General: no acute distress  HEENT: normocephalic, atraumatic  Heart: warm and well perfused, normal rate and rhythm  Lungs: breathing comfortably on room air, CTAB  Abdomen: gravid  Extremities: moving all extremities, no calf tenderness or edema  Neuro: awake and conversant  Psych: appropriate mood and affect  SVE: 3/70/-3 at office 7/25  FHT: 140/mod/+accel/-decel  [place]: intermittent contractions  BSUS: cephalic    Lab Review  Results from last 72 hours   Lab Units 08/08/24  1203   WBC AUTO x10*3/uL 7.9   HEMOGLOBIN g/dL 12.7   HEMATOCRIT % 39.8   PLATELETS AUTO x10*3/uL 147*        Results from last 72 hours   Lab Units 08/08/24  1314   SODIUM mmol/L 136   POTASSIUM mmol/L 4.1   CHLORIDE mmol/L 106   CO2 mmol/L 16*   BUN mg/dL 12   CREATININE mg/dL 0.56

## 2024-08-08 NOTE — PROGRESS NOTES
Intrapartum Progress Note    Assessment/Plan   Zulema Oliveira is a 33 y.o.  at 40w2d, LLOYD: 2024, by Last Menstrual Period c/w 13wk US admitted for rrIOL.    IOL  Method: Continue pitocin (started at 1430, currently at 10), for AROM when appropriate  SVE (1630): /-3  Pain management: epidural when requested, prior to AROM  CEFM, currently Category I  GBS: negative  EFW 1874 g (86%), AC 92% --> Leop[place] to 7.5-8 lbs   Next exam: as indicated by maternal and fetal status    gHTN  Diagnosed on admission with 2 MRBP >4hr apart  Hx sPEC in prior pregnancy, at 38w  HELLP labs negative x1 (plt 147)  Currently asymptomatic, continue to monitor  Plan to repeat HELLP labs if meets criteria for sPEC    Maternal conditions  BMI 36.59  Depression in third trimester, following with psychologist    Samanta Garcia, M4  Patient seen and evaluated with medical student. Agree with assessment above, edits made within text.  Kevin Messer, PGY4 OBGYN  Seen and discussed with Dr. Brunner      Subjective   Patient walking around room, endorses increasing contractions but denies discomfort at this point. She denies HA, SOB, CP, RUQ pain, vision changes.     Pt reports this baby feels larger than prior baby.     Objective   Last Vitals:  Temp Pulse Resp BP MAP Pulse Ox   36.3 °C (97.3 °F) 67 16 119/68   97 %     Vitals Min/Max Last 24 Hours:  Temp  Min: 36.3 °C (97.3 °F)  Max: 37 °C (98.6 °F)  Pulse  Min: 66  Max: 95  Resp  Min: 16  Max: 18  BP  Min: 119/68  Max: 142/94    Intake/Output:  No intake or output data in the 24 hours ending 24 1758    Physical Examination:  General: no acute distress  HEENT: normocephalic, atraumatic  Heart: warm and well perfused  Lungs: breathing comfortably on room air  Abdomen: gravid  Extremities: moving all extremities  Neuro: awake and conversant  Psych: appropriate mood and affect    SVE: -3  FHT: 130/mod/+accel/-decel  [place]: q2-3min    Lab Review:  Labs in chart have been  reviewed

## 2024-08-09 PROCEDURE — 59400 OBSTETRICAL CARE: CPT | Performed by: SPECIALIST

## 2024-08-09 PROCEDURE — 0KQM0ZZ REPAIR PERINEUM MUSCLE, OPEN APPROACH: ICD-10-PCS | Performed by: SPECIALIST

## 2024-08-09 PROCEDURE — 7210000002 HC LABOR PER HOUR

## 2024-08-09 PROCEDURE — 1100000001 HC PRIVATE ROOM DAILY

## 2024-08-09 PROCEDURE — 2500000005 HC RX 250 GENERAL PHARMACY W/O HCPCS

## 2024-08-09 PROCEDURE — 2500000001 HC RX 250 WO HCPCS SELF ADMINISTERED DRUGS (ALT 637 FOR MEDICARE OP)

## 2024-08-09 PROCEDURE — 59409 OBSTETRICAL CARE: CPT | Performed by: SPECIALIST

## 2024-08-09 PROCEDURE — 4A1H7CZ MONITORING OF PRODUCTS OF CONCEPTION, CARDIAC RATE, VIA NATURAL OR ARTIFICIAL OPENING: ICD-10-PCS | Performed by: SPECIALIST

## 2024-08-09 PROCEDURE — 2720000007 HC OR 272 NO HCPCS

## 2024-08-09 PROCEDURE — 2500000004 HC RX 250 GENERAL PHARMACY W/ HCPCS (ALT 636 FOR OP/ED)

## 2024-08-09 PROCEDURE — 7100000016 HC LABOR RECOVERY PER HOUR

## 2024-08-09 PROCEDURE — 88307 TISSUE EXAM BY PATHOLOGIST: CPT | Mod: TC,SUR

## 2024-08-09 PROCEDURE — 10H07YZ INSERTION OF OTHER DEVICE INTO PRODUCTS OF CONCEPTION, VIA NATURAL OR ARTIFICIAL OPENING: ICD-10-PCS | Performed by: SPECIALIST

## 2024-08-09 RX ORDER — NIFEDIPINE 10 MG/1
10 CAPSULE ORAL ONCE AS NEEDED
Status: DISCONTINUED | OUTPATIENT
Start: 2024-08-09 | End: 2024-08-11 | Stop reason: HOSPADM

## 2024-08-09 RX ORDER — ACETAMINOPHEN 325 MG/1
975 TABLET ORAL EVERY 6 HOURS
Status: DISCONTINUED | OUTPATIENT
Start: 2024-08-09 | End: 2024-08-11 | Stop reason: HOSPADM

## 2024-08-09 RX ORDER — BISACODYL 10 MG/1
10 SUPPOSITORY RECTAL DAILY PRN
Status: DISCONTINUED | OUTPATIENT
Start: 2024-08-09 | End: 2024-08-11 | Stop reason: HOSPADM

## 2024-08-09 RX ORDER — TRANEXAMIC ACID 100 MG/ML
1000 INJECTION, SOLUTION INTRAVENOUS ONCE AS NEEDED
Status: DISCONTINUED | OUTPATIENT
Start: 2024-08-09 | End: 2024-08-11 | Stop reason: HOSPADM

## 2024-08-09 RX ORDER — HYDRALAZINE HYDROCHLORIDE 20 MG/ML
5 INJECTION INTRAMUSCULAR; INTRAVENOUS ONCE AS NEEDED
Status: DISCONTINUED | OUTPATIENT
Start: 2024-08-09 | End: 2024-08-11 | Stop reason: HOSPADM

## 2024-08-09 RX ORDER — ADHESIVE BANDAGE
10 BANDAGE TOPICAL
Status: DISCONTINUED | OUTPATIENT
Start: 2024-08-09 | End: 2024-08-11 | Stop reason: HOSPADM

## 2024-08-09 RX ORDER — LIDOCAINE 560 MG/1
1 PATCH PERCUTANEOUS; TOPICAL; TRANSDERMAL
Status: DISCONTINUED | OUTPATIENT
Start: 2024-08-09 | End: 2024-08-11 | Stop reason: HOSPADM

## 2024-08-09 RX ORDER — DIPHENHYDRAMINE HCL 25 MG
25 CAPSULE ORAL EVERY 6 HOURS PRN
Status: DISCONTINUED | OUTPATIENT
Start: 2024-08-09 | End: 2024-08-11 | Stop reason: HOSPADM

## 2024-08-09 RX ORDER — OXYTOCIN 10 [USP'U]/ML
10 INJECTION, SOLUTION INTRAMUSCULAR; INTRAVENOUS ONCE AS NEEDED
Status: DISCONTINUED | OUTPATIENT
Start: 2024-08-09 | End: 2024-08-11 | Stop reason: HOSPADM

## 2024-08-09 RX ORDER — SIMETHICONE 80 MG
80 TABLET,CHEWABLE ORAL 4 TIMES DAILY PRN
Status: DISCONTINUED | OUTPATIENT
Start: 2024-08-09 | End: 2024-08-11 | Stop reason: HOSPADM

## 2024-08-09 RX ORDER — LOPERAMIDE HYDROCHLORIDE 2 MG/1
4 CAPSULE ORAL EVERY 2 HOUR PRN
Status: DISCONTINUED | OUTPATIENT
Start: 2024-08-09 | End: 2024-08-11 | Stop reason: HOSPADM

## 2024-08-09 RX ORDER — MISOPROSTOL 200 UG/1
800 TABLET ORAL ONCE AS NEEDED
Status: DISCONTINUED | OUTPATIENT
Start: 2024-08-09 | End: 2024-08-11 | Stop reason: HOSPADM

## 2024-08-09 RX ORDER — LABETALOL HYDROCHLORIDE 5 MG/ML
20 INJECTION, SOLUTION INTRAVENOUS ONCE AS NEEDED
Status: DISCONTINUED | OUTPATIENT
Start: 2024-08-09 | End: 2024-08-11 | Stop reason: HOSPADM

## 2024-08-09 RX ORDER — OXYTOCIN/0.9 % SODIUM CHLORIDE 30/500 ML
60 PLASTIC BAG, INJECTION (ML) INTRAVENOUS ONCE AS NEEDED
Status: DISCONTINUED | OUTPATIENT
Start: 2024-08-09 | End: 2024-08-11 | Stop reason: HOSPADM

## 2024-08-09 RX ORDER — CARBOPROST TROMETHAMINE 250 UG/ML
250 INJECTION, SOLUTION INTRAMUSCULAR ONCE AS NEEDED
Status: DISCONTINUED | OUTPATIENT
Start: 2024-08-09 | End: 2024-08-11 | Stop reason: HOSPADM

## 2024-08-09 RX ORDER — ONDANSETRON HYDROCHLORIDE 2 MG/ML
4 INJECTION, SOLUTION INTRAVENOUS EVERY 6 HOURS PRN
Status: DISCONTINUED | OUTPATIENT
Start: 2024-08-09 | End: 2024-08-11 | Stop reason: HOSPADM

## 2024-08-09 RX ORDER — IBUPROFEN 600 MG/1
600 TABLET ORAL EVERY 6 HOURS
Status: DISCONTINUED | OUTPATIENT
Start: 2024-08-09 | End: 2024-08-11 | Stop reason: HOSPADM

## 2024-08-09 RX ORDER — METHYLERGONOVINE MALEATE 0.2 MG/ML
0.2 INJECTION INTRAVENOUS ONCE AS NEEDED
Status: DISCONTINUED | OUTPATIENT
Start: 2024-08-09 | End: 2024-08-11 | Stop reason: HOSPADM

## 2024-08-09 RX ORDER — POLYETHYLENE GLYCOL 3350 17 G/17G
17 POWDER, FOR SOLUTION ORAL 2 TIMES DAILY PRN
Status: DISCONTINUED | OUTPATIENT
Start: 2024-08-09 | End: 2024-08-11 | Stop reason: HOSPADM

## 2024-08-09 RX ORDER — ONDANSETRON 4 MG/1
4 TABLET, FILM COATED ORAL EVERY 6 HOURS PRN
Status: DISCONTINUED | OUTPATIENT
Start: 2024-08-09 | End: 2024-08-11 | Stop reason: HOSPADM

## 2024-08-09 RX ORDER — DIPHENHYDRAMINE HYDROCHLORIDE 50 MG/ML
25 INJECTION INTRAMUSCULAR; INTRAVENOUS EVERY 6 HOURS PRN
Status: DISCONTINUED | OUTPATIENT
Start: 2024-08-09 | End: 2024-08-11 | Stop reason: HOSPADM

## 2024-08-09 RX ADMIN — ACETAMINOPHEN 975 MG: 325 TABLET ORAL at 17:32

## 2024-08-09 RX ADMIN — IBUPROFEN 600 MG: 600 TABLET ORAL at 17:32

## 2024-08-09 RX ADMIN — ACETAMINOPHEN 975 MG: 325 TABLET ORAL at 23:33

## 2024-08-09 RX ADMIN — ACETAMINOPHEN 975 MG: 325 TABLET ORAL at 05:09

## 2024-08-09 RX ADMIN — Medication 60 MILLI-UNITS/MIN: at 02:38

## 2024-08-09 RX ADMIN — IBUPROFEN 600 MG: 600 TABLET ORAL at 23:33

## 2024-08-09 RX ADMIN — ACETAMINOPHEN 975 MG: 325 TABLET ORAL at 11:21

## 2024-08-09 RX ADMIN — IBUPROFEN 600 MG: 600 TABLET ORAL at 05:10

## 2024-08-09 RX ADMIN — BENZOCAINE AND LEVOMENTHOL 1 APPLICATION: 200; 5 SPRAY TOPICAL at 12:27

## 2024-08-09 RX ADMIN — IBUPROFEN 600 MG: 600 TABLET ORAL at 11:21

## 2024-08-09 RX ADMIN — WITCH HAZEL 1 EACH: 500 SOLUTION RECTAL; TOPICAL at 12:27

## 2024-08-09 RX ADMIN — SODIUM CHLORIDE, POTASSIUM CHLORIDE, SODIUM LACTATE AND CALCIUM CHLORIDE 125 ML/HR: 600; 310; 30; 20 INJECTION, SOLUTION INTRAVENOUS at 00:48

## 2024-08-09 RX ADMIN — SODIUM CHLORIDE, POTASSIUM CHLORIDE, SODIUM LACTATE AND CALCIUM CHLORIDE 500 ML: 600; 310; 30; 20 INJECTION, SOLUTION INTRAVENOUS at 00:49

## 2024-08-09 RX ADMIN — POLYETHYLENE GLYCOL 3350 17 G: 17 POWDER, FOR SOLUTION ORAL at 12:27

## 2024-08-09 ASSESSMENT — PAIN SCALES - GENERAL
PAINLEVEL_OUTOF10: 0 - NO PAIN
PAINLEVEL_OUTOF10: 0 - NO PAIN
PAINLEVEL_OUTOF10: 6
PAINLEVEL_OUTOF10: 0 - NO PAIN
PAINLEVEL_OUTOF10: 6

## 2024-08-09 ASSESSMENT — PAIN DESCRIPTION - LOCATION: LOCATION: PERINEUM

## 2024-08-09 NOTE — CARE PLAN
Problem: Postpartum  Goal: Experiences normal postpartum course  Outcome: Progressing  Goal: Appropriate maternal -  bonding  Outcome: Progressing  Goal: Establish and maintain infant feeding pattern for adequate nutrition  Outcome: Progressing  Goal: No s/sx of hemorrhage  Outcome: Progressing     Problem: Pain - Adult  Goal: Verbalizes/displays adequate comfort level or baseline comfort level  Outcome: Progressing

## 2024-08-09 NOTE — SIGNIFICANT EVENT
"Labor Progress Note    Subjective: Patient resting in bed, epidural infusing. Amenable to AROM but wanted to wait until epidural and good contraction pattern. Denies HA, CP, SOB, vision changes or RUQ pain.    Objective:  Vitals: BP (!) 146/86   Pulse 76   Temp 36.3 °C (97.3 °F)   Resp 18   Ht 1.778 m (5' 10\")   Wt 117 kg (257 lb 0.9 oz)   LMP 10/31/2023   SpO2 98%   BMI 36.88 kg/m²   FHT: 120/mod/+accels/-decels  [place]: q2-3min    A/P:  IOL  Continue pitocin per protocol  CEFM, currently Category I  Epidural infusing    gHTN  Diagnosed on admission with 2 MRBP >4hr apart  Hx sPEC in prior pregnancy at 38wk  HELLP labs negative x1  Currently asx, continue to monitor    Samanta Garcia, M4      "

## 2024-08-09 NOTE — LACTATION NOTE
Lactation Consultant Note  Lactation Consultation  Reason for Consult: Initial assessment  Consultant Name: ROLAND Quinonez RN IBCLC    Maternal Information  Has mother  before?: Yes  How long did the mother previously breastfeed?: 3-year-old for 9 months  Previous Maternal Breastfeeding Challenges: Other (Comment) (mother's goal had been to breastfeed for 12 months, milk supply decreased when she returned to work)  Infant to breast within first 2 hours of birth?: Yes  Exclusive Pump and Bottle Feed: No    Maternal Assessment  Breast Assessment: Medium, Symmetrical, Soft, Compressible  Nipple Assessment: Intact, Erect  [place] Assessment: Normal    Infant Assessment  Infant Behavior: Deep sleep  Infant Assessment: Other (Comment) (infant sleeping)    Feeding Assessment  Nutrition Source: Breastmilk  Feeding Method: Nursing at the breast    LATCH TOOL       Breast Pump       Other OB Lactation Tools       Patient Follow-up  Inpatient Lactation Follow-up Needed : Yes    Other OB Lactation Documentation  Maternal Risk Factors: Age over 30, primiparity  Infant Risk Factors: High birth weight >3600 g    Recommendations/Summary    I met with this experienced breastfeeding mother who reports breastfeeding her 2-year-old for 9 months. She has been independently latching her  since delivery. She denies any difficulty with latching or pain/discomfort while breastfeeding.  She had recently  prior to my arrival to the room and her infant was asleep during the visit. The parents primary had concerns about milk supply because they were not able to breastfeed their older child for 1 year. The mother feels that, in part, her milk supply issues stemmed from to an inflexible work environment and not enough pumping. She will be returning to work at 12 weeks, but now has more job flexibility.    We discussed the following breastfeeding topics: early milk production; frequent feeds with goal of 8-12 feeds/24 hours;  the benefits of skin to skin for breastfeeding; the importance of a deep latch for maternal comfort and optimal transfer; alternating starting breast and allowing the infant to end the feeding;  and, delaying pumping (unless clinically indicated) and pacifier use until breastfeeding is well-established. The mother has a breast pump for home use. She was given written information on outpatient lactation services. All questions were answered and the mother is offered assistance as needed.

## 2024-08-09 NOTE — SIGNIFICANT EVENT
Patient meets criteria for home monitoring of blood pressure post discharge.  Reason: past medical history of preeclampsia,  current gestational hypertension. Met with patient to assess for availability of home BP monitor.  Patient stated she owns home BP monitor. . Patient educated on importance of continuing to monitor BP at home, recording BP on home monitoring log and s/sx of when to call her provider.  Pt verbalized understanding the above information.

## 2024-08-09 NOTE — ANESTHESIA PREPROCEDURE EVALUATION
Patient: Zulema Oliveira    Evaluation Method: In-person visit    Procedure Information    Date: 08/08/24  Procedure: Labor Consult         Relevant Problems   Anesthesia (within normal limits)      Cardiac (within normal limits)      Pulmonary (within normal limits)      Neuro   (+) Depression during pregnancy in third trimester ([facility])   (+) Generalized anxiety disorder      GI (within normal limits)  Heartburn (pregnancy related)      /Renal (within normal limits)      Liver (within normal limits)      Endocrine   (+) Obesity affecting pregnancy, antepartum ([facility])      Hematology (within normal limits)      GYN   (+) Encounter for supervision of normal pregnancy in third trimester ([facility])       Clinical information reviewed:   Tobacco  Allergies  Meds   Med Hx  Surg Hx   Fam Hx  Soc Hx        NPO Detail:  No data recorded     OB/Gyn Evaluation    Present Pregnancy    Patient is pregnant now.   Obstetric History    (+) hypertensive disorder of pregnancy - preeclampsia              Physical Exam    Airway  Mallampati: I  TM distance: >3 FB  Neck ROM: full     Cardiovascular - normal exam     Dental - normal exam     Pulmonary - normal exam     Abdominal            Anesthesia Plan    History of general anesthesia?: yes  History of complications of general anesthesia?: no    ASA 2     epidural     Use of blood products discussed with patient who.    Plan discussed with resident.

## 2024-08-09 NOTE — SIGNIFICANT EVENT
"Labor Progress Note    Subjective: Patient resting comfortably in bed. Endorses adequate pain control with epidural.  Denies HA or vision changes.    Objective:  Vitals: /75   Pulse 74   Temp 36.1 °C (97 °F)   Resp 18   Ht 1.778 m (5' 10\")   Wt 117 kg (257 lb 0.9 oz)   LMP 10/31/2023   SpO2 98%   BMI 36.88 kg/m²   SVE: 6/80/-2  FHT: 120/mod/+accels/-decels  Fitzhugh: q2-4min    AROM for clear, tinged with blood.     A/P:  Continue pitocin per protocol  CEFM, currently Category I  Epidural infusing    gHTN  - dx by mild range Bps greater than 4 hours apart  - HELLP Labs neg x 1  - asx    Samanta Garcia, M4      "

## 2024-08-09 NOTE — SIGNIFICANT EVENT
"Labor Progress Note    Subjective: Pt resting comfortably on her side in bed with fluid bolus running . Endorses cramping but no increased pelvic or rectal pressure. Called to room to assess in the setting of recurrent variable decelerations.     Objective:  Vitals: /83   Pulse 80   Temp 36.5 °C (97.7 °F)   Resp 18   Ht 1.778 m (5' 10\")   Wt 117 kg (257 lb 0.9 oz)   LMP 10/31/2023   SpO2 97%   BMI 36.88 kg/m²   SVE: 6/90/-1  FHT: 120/mod/+accels/+variable decels and early decels  Guy: q2-4min    A/P:  IOL  Continue pitocin per protocol  CEFM, currently Category II due to recurrent variable decelerations and early decelerations; overall reassured by moderate variability and positive accelerations  IUPC and FSE placed to monitor contractions and fetal heart rate more closely iso variable and early decelerations  Epidural infusing    gHTN  Dx by MRBP > 4hr apart  HELLP labs negx1  Currently asymptomatic, will continue to monitor    Samanta Garcia, M4      "

## 2024-08-09 NOTE — L&D DELIVERY NOTE
OB Delivery Note  2024  Zulema Oliveira  33 y.o.   Vaginal, Spontaneous       Gestational Age: 40w3d  /Para:   Quantitative Blood Loss: Admission to Discharge: 0 mL (2024 11:28 AM - 2024  3:33 AM)    Skip Oliveira [[id number]]      Labor Events    Rupture date/time: 2024 2338  Rupture type: Artificial  Fluid color: Clear  Fluid odor: None  Labor type: Induced Onset of Labor  Labor allowed to proceed with plans for an attempted vaginal birth?: Yes  Induction: Oxytocin  Complications: None  Additional complications: Gestational hypertension, third trimester ([facility])       Labor Event Times    Labor onset date/time: 2024 1124  Dilation complete date/time: 2024 0147  Start pushing date/time: 2024 015       Placenta    Placenta delivery date/time: 2024 0245  Placenta removal: Spontaneous  Placenta appearance: Intact  Placenta disposition: pathology       Cord    Vessels: 3 vessels  Complications: None  Delayed cord clamping?: Yes  Cord blood disposition: Lab  Gases sent?: No       Lacerations    Perineal laceration: 2nd  Perineal laceration repaired?: Yes  Repair suture: 3-0 Synthetic Suture       Anesthesia    Method: Epidural       Operative Delivery    Forceps attempted?: No  Vacuum extractor attempted?: No       Shoulder Dystocia    Shoulder dystocia present?: No       McGregor Delivery    Time head delivered: 2024 02:36:00  Birth date/time: 2024 02:36:00  Delivery type: Vaginal, Spontaneous  Complications: None       Resuscitation    Method: Tactile stimulation       Apgars    Living status: Living  Apgar Component Scores:  1 min.:  5 min.:  10 min.:  15 min.:  20 min.:    Skin color:  0  1       Heart rate:  2  2       Reflex irritability:  2  2       Muscle tone:  2  2       Respiratory effort:  2  2       Total:  8  9       Apgars assigned by: ANA GARCIA RN       Delivery Providers    Delivering clinician: Denita Adorno MD   Provider Role    Hayde  Sindyla-Agapetus, RN Delivery Nurse    Trina Samaniego RN Nursery Nurse     Resident                 Intrauterine Device Inserted : Denita Hamlin MD

## 2024-08-09 NOTE — ANESTHESIA PROCEDURE NOTES
Epidural Block    Patient location during procedure: OB  Start time: 8/8/2024 9:51 PM  End time: 8/8/2024 10:48 PM  Reason for block: labor analgesia  Staffing  Performed: resident   Authorized by: Fitz Tompkins DO    Performed by: Lama Tim MD    Preanesthetic Checklist  Completed: patient identified, IV checked, risks and benefits discussed, surgical consent, pre-op evaluation, timeout performed and sterile techniques followed  Block Timeout  RN/Licensed healthcare professional reads aloud to the Anesthesia provider and entire team: Patient identity, procedure with side and site, patient position, and as applicable the availability of implants/special equipment/special requirements.  Patient on coagulant treatment: no  Timeout performed at: 8/8/2024 9:51 PM  Block Placement  Patient position: sitting  Prep: ChloraPrep  Sterility prep: cap, gloves, hand and mask  Sedation level: no sedation  Patient monitoring: heart rate, continuous pulse oximetry and blood pressure  Approach: midline  Local numbing: lidocaine 1% to skin and subcutaneous tissues  Vertebral space: lumbar  Lumbar location: L3-L4  Epidural  Loss of resistance technique: saline  Guidance: landmark technique        Needle  Needle type: Tuohy   Needle gauge: 18  Needle length: 11.4 cm  Needle insertion depth: 6 cm  Catheter type: multi-orifice  Catheter size: 20 G  Catheter at skin depth: 11 cm  Catheter securement method: clear occlusive dressing, liquid medical adhesive and surgical tape    Test dose: lidocaine 1.5% with epinephrine 1-to-200,000  Test dose: lidocaine 1.5% with epinephrine 1-to-200,000  Test dose result: no positive test dose    PCEA  Medication concentration used: 0.044% Bupivacaine with 1.25 mcg/mL Fentanyl and [id number] Epinephrine  Dose (mL): 10  Lockout (minutes): 15  1-Hour Limit (boluses/hr): 4  Basal Rate: 14        Assessment  Sensory level: T10 bilateral  Block outcome: pain improved  Number of attempts: 1  Events: no  positive test dose  Procedure assessment: patient tolerated procedure well with no immediate complications

## 2024-08-10 PROCEDURE — 2500000004 HC RX 250 GENERAL PHARMACY W/ HCPCS (ALT 636 FOR OP/ED)

## 2024-08-10 PROCEDURE — 2500000001 HC RX 250 WO HCPCS SELF ADMINISTERED DRUGS (ALT 637 FOR MEDICARE OP)

## 2024-08-10 PROCEDURE — 1100000001 HC PRIVATE ROOM DAILY

## 2024-08-10 RX ADMIN — POLYETHYLENE GLYCOL 3350 17 G: 17 POWDER, FOR SOLUTION ORAL at 11:35

## 2024-08-10 RX ADMIN — ACETAMINOPHEN 975 MG: 325 TABLET ORAL at 23:21

## 2024-08-10 RX ADMIN — IBUPROFEN 600 MG: 600 TABLET ORAL at 23:21

## 2024-08-10 RX ADMIN — IBUPROFEN 600 MG: 600 TABLET ORAL at 05:18

## 2024-08-10 RX ADMIN — IBUPROFEN 600 MG: 600 TABLET ORAL at 17:44

## 2024-08-10 RX ADMIN — ACETAMINOPHEN 975 MG: 325 TABLET ORAL at 05:18

## 2024-08-10 RX ADMIN — ACETAMINOPHEN 975 MG: 325 TABLET ORAL at 17:44

## 2024-08-10 RX ADMIN — IBUPROFEN 600 MG: 600 TABLET ORAL at 11:30

## 2024-08-10 RX ADMIN — ACETAMINOPHEN 975 MG: 325 TABLET ORAL at 11:30

## 2024-08-10 ASSESSMENT — PAIN SCALES - GENERAL
PAINLEVEL_OUTOF10: 0 - NO PAIN
PAINLEVEL_OUTOF10: 3
PAINLEVEL_OUTOF10: 4
PAINLEVEL_OUTOF10: 0 - NO PAIN

## 2024-08-10 ASSESSMENT — PAIN DESCRIPTION - LOCATION: LOCATION: ABDOMEN

## 2024-08-10 NOTE — PROGRESS NOTES
Postpartum Progress Note    Assessment/Plan   Zulema Oliveira is a 33 y.o., , who delivered at 40w3d gestation    Now PPD#1 s/p Vaginal, Spontaneous on 2024  - continue routine postpartum care  - pain well controlled on po medications  - dvt risk score DVT Score: 5 , ppx with lovenox  - Hgb:   Results from last 7 days   Lab Units 24  1203   HEMOGLOBIN g/dL 12.7      gHTN  - dx by 2 mild range pressures >4hrs apart; asymptomatic  - now normotensive on no meds  - HELLP labs negative  - BP cuff for home  - reviewed 3 day stay for BP monitoring. pt requests dc on day 2 if bp stable     Maternal Well-Being  - emotional support provided    [place] Feeding  - breastfeeding/pumping encouraged; lactation consult prn    Contraception  - education provided  - interval IUD    Dispo  - Anticipate DC PPD2-3 pending BP control.  - The signs and symptoms of PEC were reviewed with the patient, including unrelenting headache, vision changes/blurred vision, and pain underneath the right breast.   - BP cuff for home for checking BP BID. Pt instructed to call primary OB if SBP > 160 or DBP > 110.   Follow up in 2-5 days for BP check with your OB provider. and Follow up in 4-6 wk for postpartum visit with your OB provider.     Madhuri Boone PA-C  Postpartum Pager [phone]    Principal Problem:    Encounter for induction of labor ([facility])    Pregnancy Problems (from 23 to present)       Problem Noted Resolved    Encounter for induction of labor ([facility]) 2024 by Grzegorz Blake MD No    Priority:  Medium      Depression during pregnancy in third trimester ([facility]) 3/5/2024 by Samir Arrieta, PhD No    Priority:  Medium      Overview Signed 2024  1:05 PM by Tito Watson MD     Following with behavioral health         Obesity affecting pregnancy, antepartum ([facility]) 2024 by Angi Corona MD No    Priority:  Medium      Overview Signed 2024  1:08 PM by Tito Watson MD      Pregravid BMI: 28.70  No indication for growth/ ANT           Encounter for supervision of normal pregnancy in third trimester ([facility]) 12/18/2023 by Tito Watson MD No    Priority:  Medium      Overview Addendum 7/25/2024  8:43 AM by Tito Watson MD     GBS Neg  Feeling well, occasional contractions but nothing regular. Good FM, no VB or LOF    Offered membrane sweep - reviewed risks/benefits, verbal consent obtained  SVE 3/70/-3  Membranes swept  Vertex  Mom starting Chemo likely week of 8/12 - desires IOL appointment by 8/9 so she can be there - requested, will notify with final date         Nausea and vomiting in pregnancy prior to 22 weeks gestation ([facility]) 1/15/2024 by Tito Watson MD 4/11/2024 by Tito Watson MD    Priority:  Medium      Breech presentation ([facility]) 6/17/2024 by Tito Watson MD 7/18/2024 by Tito Watson MD    Overview Addendum 7/1/2024  9:29 AM by Tito Watson MD     Breech at 30w growth  Discussed likelihood of flipping by 37 w and plan to re-check then  Info on [employer] provided at patient request  Feels suzi ho has flipped  [place] today with         Severe pre-eclampsia in third trimester ([facility]) 6/25/2022 by Angi Corona MD 6/5/2024 by Angi Corona MD          Hospital course: gestational hypertension  Vaginal Birth  Patient is currently breastfeedingThe patient's blood type is B POS. Rhogam is not indicated.    Subjective   Her pain is well controlled with current medications  She is passing flatus  She is ambulating well  She is tolerating a Adult diet Regular  She reports no breast or nursing problems  She denies emotional concerns today   Her plan for contraception is interval hormonal IUD     Pt seen at the bedside in NAD. Doing well, bonding w baby, dad at bedside. She is aware of gHTN dx. Requests day 2 dc if bp stable. Denies HA, N/V, RUQ pain, vision changes. Denies CP, SOB, calf pain, fever,  passage of large clots.     Objective   Allergies:   Patient has no known allergies.         Last Vitals:  Temp Pulse Resp BP MAP Pulse Ox   36.2 °C (97.2 °F) 78 16 122/85   96 %     Vitals Min/Max Last 24 Hours:  Temp  Min: 36 °C (96.8 °F)  Max: 36.9 °C (98.4 °F)  Pulse  Min: 74  Max: 82  Resp  Min: 14  Max: 17  BP  Min: 112/68  Max: 135/85    Intake/Output:     Intake/Output Summary (Last 24 hours) at 8/10/2024 1157  Last data filed at 8/9/2024 1300  Gross per 24 hour   Intake --   Output 800 ml   Net -800 ml       Physical Exam:  General: Examination reveals a well developed, well nourished, female, in no acute distress. She is alert and cooperative.  HEENT: External ears normal. Nose normal, no erythema or discharge.  Neck: supple, no significant adenopathy.  Lungs: breathing even and unlabored   Cardiac: warm and well perfused .  Fundus: firm and below umbilicus. Lochia light  Extremities: no redness or tenderness in the calves or thighs, no edema.  Neurological: alert, oriented, normal speech, no focal findings or movement disorder noted.  Psychological: awake and alert; oriented to person, place, and time.  Skin: no rashes or lesions    Lab Data:  Labs in chart were reviewed.

## 2024-08-10 NOTE — SIGNIFICANT EVENT
MD notified by RN of vaginal bleeding soaking through pad.    On evaluation, pt was lying comfortably in bed. She reports that she felt a large cramp while breast feeding and blood passing through her vagina then went into the bathroom to change her pad. She is not having any lightheadedness, dizziness or any other symptoms. Her bleeding has otherwise been scant.     On viewing the pad, blood was along the surface of the ice pack covering had blood covering the surface, with very little bleeding below into pad and no bleeding through the sides of the ice pack. Fundus was firm, midline and below umbilicus.     Low concern for PPH at this time. Likely bleeding 2/2 uterine contraction with concurrent breast feeding with high-appearing volume given the cover of the ice pack heavy with ice pack in place full of fluid. Very low bleeding into pad below. RN to check bleeding in one hour to confirm resolution of bleeding and will monitor symptoms.     Katie Allen MD PGY-1

## 2024-08-10 NOTE — ANESTHESIA POSTPROCEDURE EVALUATION
Patient: Zulema Oliveira    Procedure Summary       Date: 08/08/24 Room / Location:     Anesthesia Start: 2151 Anesthesia Stop: 08/09/24 0236    Procedure: Labor Analgesia Diagnosis:     Scheduled Providers:  Responsible Provider: Fitz Tompkins DO    Anesthesia Type: epidural ASA Status: 2          Anesthesia Type: epidural    Vitals Value Taken Time   /75 08/10/24 0747   Temp 36.2C 08/10/24 0747   Pulse 78 08/10/24 0747   Resp 16 08/10/24 0747   SpO2 96% 08/10/24 0747       Anesthesia Post Evaluation    Patient location during evaluation: floor  Patient participation: complete - patient participated  Level of consciousness: awake and alert  Pain management: adequate  Multimodal analgesia pain management approach  Airway patency: patent  Cardiovascular status: hemodynamically stable  Respiratory status: room air and acceptable  Hydration status: acceptable  Postoperative Nausea and Vomiting: none    Patient notes some back pain that is sore in nature but is improving quickly and only mild today. No radiation to lower extremities or numbness/tingling. No N/V. No signs of PDPH. Denies difficulty with ambulation. Voiding without issue.     Patient resting comfortably in bed. Epidural site checked. No swelling, erythema, warmth or drainage noted.    No notable events documented.

## 2024-08-10 NOTE — LACTATION NOTE
Lactation Consultant Note    Recommendations/Summary  Mom called out for assistance because she has been experiencing intense pain with breastfeeding and her nipples are severely bruised and blistered bilaterally. She said that baby fed all night and could only be soothed if he was at her breast. I assessed baby's mouth and suck and noted a tight frenulum. Baby has difficulty cupping his tongue and extending it past the gumline. I showed parents some exercises they can do (suck training, jaw massage, and a gentle neck and shoulder release) prior to feeding to help improve baby's latch. I also advocated for a frenectomy if peds was agreeable. Pediatrician came in to see baby while I was in the room and she agreed that the frenulum is tight and that a frenectomy would be appropriate. Parents have elected to have the procedure. However, ENT is not available today to perform this procedure. The peds attending will be able to do the frenectomy tomorrow.     Baby was then ready for a feed so I assisted mom with a latch at the left breast in football hold. Baby latched as deeply as he could but was still using his jaw and lips to hold onto mom's breast. Mom was in pain during the feed. I discussed with mom the option to pump to allow her nipples to heal while we wait for the tongue tie to be clipped. Mom desires to pump right now, rather than continuing to latch. I set mom up to pump and reviewed pump set up, pump part cleaning, pumping frequency and duration, and safe breast milk storage guidelines. Mom's nipples measure 22 mm in diameter, so I instructed her to use 24 mm flanges. Discussed typical pumped milk volumes over the first several days postpartum. Since mom has switched to exclusively pumping for now, baby will require some supplementation with donor milk or formula. Parents elected to feed ready to feed formula. Plan is to pump both breasts every 3 hours for 15-20 mins at a time. Parents will feed baby any  pumped milk she produces via syringe and then supplement with ready to feed formula via paced bottle feeding (reviewed how to do so with parents).     Once frenectomy procedure is performed tomorrow, mom plans to call out for lactation assistance to try latching baby prior to discharge. Mom has a pump for at home. We reviewed the outpatient lactation information.

## 2024-08-11 VITALS
RESPIRATION RATE: 16 BRPM | OXYGEN SATURATION: 94 % | SYSTOLIC BLOOD PRESSURE: 124 MMHG | HEART RATE: 71 BPM | HEIGHT: 70 IN | BODY MASS INDEX: 36.8 KG/M2 | WEIGHT: 257.06 LBS | TEMPERATURE: 97.3 F | DIASTOLIC BLOOD PRESSURE: 84 MMHG

## 2024-08-11 LAB
APPEARANCE UR: CLEAR
BILIRUB UR STRIP.AUTO-MCNC: NEGATIVE MG/DL
COLOR UR: ABNORMAL
GLUCOSE UR STRIP.AUTO-MCNC: NORMAL MG/DL
HOLD SPECIMEN: NORMAL
KETONES UR STRIP.AUTO-MCNC: NEGATIVE MG/DL
LEUKOCYTE ESTERASE UR QL STRIP.AUTO: ABNORMAL
NITRITE UR QL STRIP.AUTO: NEGATIVE
PH UR STRIP.AUTO: 6.5 [PH]
PROT UR STRIP.AUTO-MCNC: ABNORMAL MG/DL
RBC # UR STRIP.AUTO: ABNORMAL /UL
RBC #/AREA URNS AUTO: >20 /HPF
SP GR UR STRIP.AUTO: 1.02
SQUAMOUS #/AREA URNS AUTO: ABNORMAL /HPF
UROBILINOGEN UR STRIP.AUTO-MCNC: NORMAL MG/DL
WBC #/AREA URNS AUTO: ABNORMAL /HPF

## 2024-08-11 PROCEDURE — 2500000004 HC RX 250 GENERAL PHARMACY W/ HCPCS (ALT 636 FOR OP/ED)

## 2024-08-11 PROCEDURE — 81003 URINALYSIS AUTO W/O SCOPE: CPT

## 2024-08-11 PROCEDURE — 87086 URINE CULTURE/COLONY COUNT: CPT

## 2024-08-11 PROCEDURE — 2500000001 HC RX 250 WO HCPCS SELF ADMINISTERED DRUGS (ALT 637 FOR MEDICARE OP)

## 2024-08-11 RX ORDER — POLYETHYLENE GLYCOL 3350 17 G/17G
17 POWDER, FOR SOLUTION ORAL DAILY
Qty: 30 PACKET | Refills: 0 | Status: SHIPPED | OUTPATIENT
Start: 2024-08-11 | End: 2024-09-10

## 2024-08-11 RX ORDER — IBUPROFEN 600 MG/1
600 TABLET ORAL EVERY 6 HOURS
Qty: 120 TABLET | Refills: 0 | Status: SHIPPED | OUTPATIENT
Start: 2024-08-11 | End: 2024-09-10

## 2024-08-11 RX ORDER — ACETAMINOPHEN 325 MG/1
975 TABLET ORAL EVERY 6 HOURS
Qty: 360 TABLET | Refills: 0 | Status: SHIPPED | OUTPATIENT
Start: 2024-08-11 | End: 2024-09-10

## 2024-08-11 RX ADMIN — POLYETHYLENE GLYCOL 3350 17 G: 17 POWDER, FOR SOLUTION ORAL at 08:58

## 2024-08-11 RX ADMIN — IBUPROFEN 600 MG: 600 TABLET ORAL at 05:13

## 2024-08-11 RX ADMIN — ACETAMINOPHEN 975 MG: 325 TABLET ORAL at 12:50

## 2024-08-11 RX ADMIN — IBUPROFEN 600 MG: 600 TABLET ORAL at 12:50

## 2024-08-11 RX ADMIN — ACETAMINOPHEN 975 MG: 325 TABLET ORAL at 05:14

## 2024-08-11 ASSESSMENT — PAIN SCALES - GENERAL: PAINLEVEL_OUTOF10: 0 - NO PAIN

## 2024-08-11 NOTE — CARE PLAN
Problem: Vaginal Birth or  Section  Goal: Fetal and maternal status remain reassuring during the birth process  Outcome: Adequate for Discharge  Goal: Prevention of malpresentation/labor dystocia through delivery  Outcome: Adequate for Discharge  Goal: Demonstrates labor coping techniques through delivery  Outcome: Adequate for Discharge  Goal: Minimal s/sx of HDP and BP<160/110  Outcome: Adequate for Discharge  Goal: No s/sx of infection through recovery  Outcome: Adequate for Discharge  Goal: No s/sx of hemorrhage through recovery  Outcome: Adequate for Discharge     Problem: Postpartum  Goal: Minimal s/sx of HDP and BP<160/110  Outcome: Adequate for Discharge  Goal: Experiences normal postpartum course  Outcome: Adequate for Discharge  Goal: Appropriate maternal -  bonding  Outcome: Adequate for Discharge  Goal: Establish and maintain infant feeding pattern for adequate nutrition  Outcome: Adequate for Discharge  Goal: No s/sx infection  Outcome: Adequate for Discharge  Goal: No s/sx of hemorrhage  Outcome: Adequate for Discharge     Problem: Hypertensive Disorder of Pregnancy (HDP)  Goal: Minimal s/sx of HDP and BP<160/110  Outcome: Adequate for Discharge     Problem: Pain - Adult  Goal: Verbalizes/displays adequate comfort level or baseline comfort level  Outcome: Adequate for Discharge     Problem: Safety - Adult  Goal: Free from fall injury  Outcome: Adequate for Discharge

## 2024-08-11 NOTE — DISCHARGE SUMMARY
Discharge Summary    Admission Date: 2024  Discharge Date: 2024     Discharge Diagnosis  Encounter for induction of labor ([facility])    Hospital Course  Delivery Date: 2024 2:36 AM  Delivery type: Vaginal, Spontaneous   GA at delivery: 40w3d  Outcome: Living  Anesthesia during delivery: Epidural  Intrapartum complications: None  Feeding method: Breastfeeding Status: Yes       Procedures:    Contraception at discharge: none, plans interval IUD    Postpartum Course:  - Pain controlled on PO meds  - Hgb: 12.7  - gHTN: dx on admission, HELLP labs WNL, normotensive postpartum, asx, reviewed recommendation of 3 day stay however pt requests dc on PPD2, bp cuff for home, and fu arranged   - had x1 episode of urinary urgency/incontinence overnight, had control of bladder before and after, educated if this continues should contact pelvic floor team, she already has fu scheduled with [facility] d/t seeing them after last delivery for trigger points     Patient seen on day of DC. Continuing to meet all PP milestones. All questions/concerns addressed. She is medically stable and feels comfortable going home today w/ follow up as below. I have reviewed with the patient the standard 3 day stay for hypertensive disorders and the risks/benefits of early discharge, including death, stroke, seizure, and readmission. Pt verbalized understanding, states that she feels well, and desires discharge today.   - The signs and symptoms of PEC were reviewed with the patient, including unrelenting headache, vision changes/blurred vision, and pain underneath the right breast.   - BP cuff for home for checking BP BID. Pt instructed to call primary OB if SBP > 160 or DBP > 110.     JAMAAL Serna/Stephen    Pertinent Physical Exam At Time of Discharge  General: Examination reveals a well developed, well nourished, female, in no acute distress. She is alert and cooperative.  Abdomen: soft, non-distended, non-tender to  palpation.  Fundus: firm, below umbilicus, and nontender.  Psychological: awake and alert; oriented to person, place, and time.  Skin: no rashes or lesions     Discharge Meds     Your medication list        START taking these medications        Instructions Last Dose Given Next Dose Due   acetaminophen 325 mg tablet  Commonly known as: Tylenol      Take 3 tablets (975 mg) by mouth every 6 hours.       ibuprofen 600 mg tablet      Take 1 tablet (600 mg) by mouth every 6 hours.       polyethylene glycol 17 gram packet  Commonly known as: Glycolax, Miralax      Take 17 g by mouth once daily.              CONTINUE taking these medications        Instructions Last Dose Given Next Dose Due   ONE DAILY PRENATAL ORAL           sennosides-docusate sodium 8.6-50 mg tablet  Commonly known as: Matilde-Colace      Take 1 tablet by mouth 2 times a day.              STOP taking these medications      aspirin 81 mg EC tablet                  Where to Get Your Medications        These medications were sent to [facility]/pharmacy #[id number] [address] AT CORNER OF [place] [id number]      Phone: [phone]   acetaminophen 325 mg tablet  ibuprofen 600 mg tablet  polyethylene glycol 17 gram packet          Complications Requiring Follow-Up  Follow up in 2-5 days for BP check with your OB provider. and Follow up in 4-6 wk for postpartum visit with your OB provider.     Test Results Pending At Discharge  Pending Labs       Order Current Status    Extra Urine Gray Tube In process    Surgical Pathology Exam - PLACENTA In process    Urinalysis with Reflex Culture and Microscopic In process    Urine Culture In process            Outpatient Follow-Up  Future Appointments   Date Time Provider Department Center   8/13/2024  2:00 PM Samir Arrieta, PhD [id number] [facility]   9/26/2024  5:00 PM Maggie Mccormack, PT [id number] [place]   10/3/2024  3:00 PM Maggie Mccormack, PT [id number] [place]    10/10/2024  5:00 PM Maggie Mccormack, PT [id number] [place]   10/17/2024  2:00 PM Maggie Mccormack, PT [id number] [place]       I spent 15 minutes in the professional and overall care of this patient.      Madhuri Boone PA-C

## 2024-08-11 NOTE — PROGRESS NOTES
Social Work Assessment      Patient: Zulema Oliveira  Address: [address] [place] [address]  Phone: [phone] (MOB); [phone] (FOB)     Referral Reason: history of depression     Prenatal Care: Good PNC beginning 23  Barriers: None identififed      Name: Keo Oliveira  [place] : 24     Other Children: 2 year old son     FOB: Junito Oliveira    Household Composition: Baby Boy will reside in a home with both parents and 2 year old brother    Supports: Both parents are from the area and have family and friends nearby who are very supportive     IPV/DV or Safety Concerns: Denies     Safe Sleep Education: SW reviewed principles and importance of safe sleep. Baby Boy will be sleeping in a bassinet at parent's bedside but also has his own room with a crib which he will transition to when he is a little older.     Transportation Concerns: None identified.      School/Work/Income: SOFI works at  [employer] as a patient educator, and FOLISSA works in sales. SOFI plans to take 12 weeks off to recover and turner with Baby Boy. JACQUIE is using up his vacation but also has some flexibility to work from home.     Insurance:  Employee Medical Plan     Substance Use History: Denies     Mental Health Diagnoses/Concerns:  SW reviewed postpartum depression signs, symptoms, and resources and patient indicated understanding. Parents were open to receiving info about [employer]'s online resources and support groups. Ms. Oliveira shared that she has experienced anxiety after the birth of her last child and again during this pregnancy. She has been seeing a therapist through  since the spring and shared that this has been very helpful for managing her anxiety. She has not tried medication for her anxiety but shared that she feels she is managing well without it.    Bonding: NO bonding concerns noted. JACQUIE was observed holding Baby Boy securely through the meeting with this . Both spoke very lovingly  of both of their children.      Department of Children and Family Services (DCFS): Denies history of DCFS involvement     Assessment:   This  met with MOB Ms. Zulema Oliveira and FOB Mr. Junito Oliveira at bedside. Both were friendly and appeared open to speaking with this . This is the second child for both parents who also have a 2 year old son at home. Both parents have family and friends in the area for support. They are both employed, and MOB works in healthcare as a patient educator at [employer]. MOB has a history of anxiety for which she sees a therapist. MOB feels that this is helpful and manageable. No HI/SI. Parents report having all necessary baby items at home. Baby boy will be sleeping alone in a bassinet at bedside. No psychosocial needs identified at this time. Parents both appear resourceful and willing to reach out should they have additional needs in the future.     Plan:   Baby Boy and MOB are clear for discharge from a social work perspective when medically ready. SW will remain available as needed.     Signature: GLEN Patel, RN

## 2024-08-12 LAB — BACTERIA UR CULT: NORMAL

## 2024-08-13 ENCOUNTER — APPOINTMENT (OUTPATIENT)
Dept: BEHAVIORAL HEALTH | Facility: CLINIC | Age: 34
End: 2024-08-13
Payer: COMMERCIAL

## 2024-08-13 DIAGNOSIS — O99.343 DEPRESSION DURING PREGNANCY IN THIRD TRIMESTER (HHS-HCC): ICD-10-CM

## 2024-08-13 DIAGNOSIS — F32.A DEPRESSION DURING PREGNANCY IN THIRD TRIMESTER (HHS-HCC): ICD-10-CM

## 2024-08-13 DIAGNOSIS — F41.1 GENERALIZED ANXIETY DISORDER: Primary | Chronic | ICD-10-CM

## 2024-08-13 PROCEDURE — 90837 PSYTX W PT 60 MINUTES: CPT | Performed by: PSYCHOLOGIST

## 2024-08-13 NOTE — PROGRESS NOTES
Behavioral Medicine Psychotherapy Progress Note      IDENTIFYING AND REFERRAL INFORMATION:  Zulema Oliveira is a 33 y.o. able-bodied employed,  White female parent of one who is currently pregnant and dealing with exacerbation of depressive symptoms. She presented for follow-up.     Start Time: 2 pm  End Time: 3 pm  Time Spent with Patient: 50 minutes    Session number 9 with this psychologist.    This is a virtual video visit.     Telephone/Televideo Informed Consent for Psychotherapy was reviewed with the patient as needed. The purpose of the meeting was reviewed as indicated, and limits of confidentiality reviewed as needed. Patient stated an understanding of the information and agreed to the session.       Symptoms: anxiety, low mood, crying more easily than usual, feeling inadequate, ruminating on not being a good enough parent, fatigue (some pregnancy related), intermittent guilt.      Focus of treatment: identifying unhelpful responses and modifying them to be more adaptive, self-supportive       Session Content:  She gave birth last week, had to be induced. Course of childbirth was generally smooth. She was sad her mother could not be there same day, after she gave birth. Mother was also admitted, for her first cancer treatment. She said it was not what she wanted, but she got through it. Explored what coping tools helped her get through the less than ideal scenario.    She said her ruminating has returned and is similar to her experience after having her first child. She described focusing on wanting to be a perfect parent to keep her baby safe and healthy. She acknowledged she is anxious about risk of an accident, any kind, harming her baby in some way and if a situation triggers a fear or worry she will ruminate on it until she is able to talk with someone about it. She gave an example of stumbling the other day and feeling like she was going to fall and drop her baby; she maintained her balance and  "kept hold of her son. It was a good outcome. However, she ruminated on the possibility of it ending badly and her fear of harm coming to her son. Eventually she started catastrophizing about being a bad mother. She was able to talk with her  and use her self-affirmations to stop ruminating. She noted she is comfortable with loved ones taking care of her  and is not experiencing separation anxiety. No thoughts of self or other harm.     Provider explored this ruminative response pattern further and discussed other worry management tools that may be helpful in addition to those she mentioned- including diverting attention, managing physical symptoms of her anxiety, and cognitive coping tools. Provider reinforced her gains and reviewed helpful tools. Discussed normalizing mistakes as part of the practice; she said this is a challenge and a work in progress.     She declined referral for medication assessment at this time. Willing to discuss further if her symptoms worsen.     Additional historical information:   She wants to continue the appreciation journal, as a regular practice of recognizing her appreciation for her efforts in a day (up to 3 examples). She also continues to remind herself of the \"good enough parent\" as a means of normalizing having limits to one's capacity/abilities.    , two children. Son Nakul is two and has  son. 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     Current Psychiatric Medications:  None noted.     Objective:  Mental Status  Orientation and consciousness: [x ]oriented X 3 and attentive     [ ]other, explain:   Appearance/grooming :    [x ]appropriate [ ]poor grooming/hygiene bizarre appearance    [ ]other, explain:    Behavior: [x ]appropriate to context. Easily teary.   [ ]inappropriate and/or bizarre, explain:    Speech: [x]normal rate/rhythm [ ]pressured speech []slow    [ ]other,    Language: [x ]normal    [ " ]abnormalities noted, explain:    Mood: [ ]euthymic [ ]depressed [ ]dysphoric [ x]anxious    [ ]euphoric [ ] other   Affect: [x]mood congruent. Appropriately teary and sad during session.   []incongruent, explain:    Evidence of perceptual disturbances (hallucinations, illusions, etc.):    [ x]no    [ ]yes, explain:   Thought processes:     [x ]normal and congruent     [ ]other, explain:    Insight: [ ]good [x ]adequate [ ]poor []questionable   Judgment: [ ]good [x ]adequate [ ]poor []questionable   Fund of Knowledge:[ ]above average [x ]average [ ]below average    Risk Assessment  Homicidal intentions: no  Homicidal plan: no  Suicidal intentions: no  Suicidal plan: no  Risk of harm low at this time.       Therapeutic Intervention:   [ x] Psychosocial  [ ] Treatment Goals  [ x] Cognitive/Behavioral- included changing negative self-talk, identifying idstorted thinking, working on A-B-C's exercise (antecedent-behavior-consequence).   [ x] Psychoeducation   [ ] Insight/Psychodynamic  [ ] Problem solving  [ x] Supportive  [ ] Referral to additional/other treatment/resources  Provider explored this ruminative response pattern further.   Provider validated her emotions, focused on worry management strategies discussed previously and how they could apply here as coping tools for her worries about loss, and explored outlets for emotional support and for comfort. Discussed normalizing mistakes as part of the practice; she said this is a challenge and a work in progress.    Diagnosis:   ERINN  depressive disorder, unspecified, during pregnancy    Treatment Plan/Recommendations:   rtc in about 2 weeks for individual psychotherapy with this psychologist via video telehealth. Patient agreed to appointment frequency and plan. Patient has scheduling contact info to use as needed. Upcoming appointment already scheduled.     Provider emailed resources after previous session- distorted thinking list and an ABC table. Will revisit when  applicable.     Resume appreciation journal when able.     Prognosis: good    Progress to date: some, as noted.       Samir Arrieta, PhD

## 2024-08-15 ENCOUNTER — APPOINTMENT (OUTPATIENT)
Dept: OBSTETRICS AND GYNECOLOGY | Facility: CLINIC | Age: 34
End: 2024-08-15
Payer: COMMERCIAL

## 2024-08-16 ENCOUNTER — CLINICAL SUPPORT (OUTPATIENT)
Dept: OBSTETRICS AND GYNECOLOGY | Facility: HOSPITAL | Age: 34
End: 2024-08-16
Payer: COMMERCIAL

## 2024-08-16 ENCOUNTER — APPOINTMENT (OUTPATIENT)
Dept: OBSTETRICS AND GYNECOLOGY | Facility: HOSPITAL | Age: 34
End: 2024-08-16
Payer: COMMERCIAL

## 2024-08-16 ENCOUNTER — LACTATION ENCOUNTER (OUTPATIENT)
Dept: PEDIATRICS | Facility: HOSPITAL | Age: 34
End: 2024-08-16

## 2024-08-16 VITALS
SYSTOLIC BLOOD PRESSURE: 136 MMHG | HEIGHT: 70 IN | BODY MASS INDEX: 33.07 KG/M2 | DIASTOLIC BLOOD PRESSURE: 89 MMHG | WEIGHT: 231 LBS

## 2024-08-16 DIAGNOSIS — R10.2 PELVIC PAIN: ICD-10-CM

## 2024-08-16 LAB
LABORATORY COMMENT REPORT: NORMAL
PATH REPORT.FINAL DX SPEC: NORMAL
PATH REPORT.GROSS SPEC: NORMAL
PATH REPORT.RELEVANT HX SPEC: NORMAL
PATH REPORT.TOTAL CANCER: NORMAL
POC APPEARANCE, URINE: CLEAR
POC BILIRUBIN, URINE: NEGATIVE
POC BLOOD, URINE: ABNORMAL
POC COLOR, URINE: ABNORMAL
POC GLUCOSE, URINE: NEGATIVE MG/DL
POC KETONES, URINE: NEGATIVE MG/DL
POC LEUKOCYTES, URINE: ABNORMAL
POC NITRITE,URINE: NEGATIVE
POC PH, URINE: 7 PH
POC PROTEIN, URINE: NEGATIVE MG/DL
POC SPECIFIC GRAVITY, URINE: 1.01
POC UROBILINOGEN, URINE: 0.2 EU/DL

## 2024-08-16 PROCEDURE — 81002 URINALYSIS NONAUTO W/O SCOPE: CPT

## 2024-08-16 PROCEDURE — 81002 URINALYSIS NONAUTO W/O SCOPE: CPT | Performed by: ADVANCED PRACTICE MIDWIFE

## 2024-08-16 NOTE — PROGRESS NOTES
Patient identified by name and   Patient had a vaginal delivery on 24  /89  Pt denies any headache, lightheadedness, dizziness, blurred vision, or pain in the upper right quadrant.   Patient has complaints of vaginal and bladder pain  Patient was not prescribed medication  Patient is breastfeeding pumping regularly  Patient denies any symptoms of depression, SI or HI  Patient appears well and overall in a good condition  Patient encouraged to continue to take  her medications as ordered  Patient also informed that she should schedule her PPV  Patient verbalized understanding    Olya Bolton notified. UA collected

## 2024-08-16 NOTE — LACTATION NOTE
This note was copied from a baby's chart.  Lactation Consultant Note  Lactation Consultation  Reason for Consult: Initial assessment  Consultant Name: Desiree Delgado    Maternal Information  Has mother  before?: Yes    Maternal Assessment  Breast Assessment: Large  Nipple Assessment: Intact  Areola Assessment: Normal    Infant Assessment  Infant Behavior: Sucking, Content after feeding  Infant Assessment: Post status frenotomy (per mom)    Feeding Assessment  Feeding Method: Nursing at the breast  Feeding Position: Cradle  Suck/Feeding: Sustained, Coordinated suck/swallow/breathe  Latch Assessment: Instructed on deep latch, Deep latch obtained    LATCH TOOL  Latch: Grasps breast, tongue down, lips flanged, rhythmic sucking  Audible Swallowing: Spontaneous and intermittent (24 hours old)  Type of Nipple: Everted (After stimulation)  Comfort (Breast/Nipple): Soft/non-tender  Hold (Positioning): No assist from staff, mother able to position/hold infant  LATCH Score: 10    Breast Pump  Pump:  (Mom only pumps a few times a day when needed.)    Other OB Lactation Tools       Patient Follow-up  Outpatient Lactation Follow-up: Recommended (mom given outpatient info if needed.)    Other OB Lactation Documentation       Recommendations/Summary  Mom had infant on breast upon entering room. Mom states it was really painful because of his tongue tie, that has since been clipped, mom states it is now just a lilttle tender. Encouraged mom to hold infant a little closer to her body, belly to belly, to achieve a deeper latch. Mom states that feels more comfortable. Talked about different breastfeeding positions, and what a deep latch should look like.   Reviewed Breastfeeding discharge information: Electric Breast Pumps & Milk Storage for Your Healthy Baby, Breast-feeding: Tips to Increase Your Milk Supply, Is My Breast-fed Baby Getting Enough to Eat?, Nursing Your Baby,  Lactation Center Information, [facility] Breastfeeding &  safe sleep information, [facility] Breastfeeding Hotline.  Your baby should nurse a minimum of 8-12 times in 24 hours (every 2-3 hours). Wake a sleepy baby every 3 hours to feed, even during the night. The more often you nurse, the more milk your body will produce. Burp your baby when switching sides and at the end of a feeding. Expect at least 1 wet diaper per day for the first 2 days, increasing to 6-8 diapers per day by day 7 of age.Reviewed Breastfeeding discharge information: Electric Breast Pumps & Milk Storage for Your Healthy Baby, Breast-feeding: Tips to Increase Your Milk Supply, Is My Breast-fed Baby Getting Enough to Eat?, Nursing Your Baby,  Lactation Center Information, [facility] Breastfeeding & safe sleep information, [facility] Breastfeeding Hotline.  Your baby should nurse a minimum of 8-12 times in 24 hours (every 2-3 hours). Wake a sleepy baby every 3 hours to feed, even during the night. The more often you nurse, the more milk your body will produce. Burp your baby when switching sides and at the end of a feeding. Expect at least 1 wet diaper per day for the first 2 days, increasing to 6-8 diapers per day by day 7 of age. Encouraged mom to contact lactation with any further questions or concerns.

## 2024-08-29 ENCOUNTER — APPOINTMENT (OUTPATIENT)
Dept: OBSTETRICS AND GYNECOLOGY | Facility: CLINIC | Age: 34
End: 2024-08-29
Payer: COMMERCIAL

## 2024-09-10 ENCOUNTER — APPOINTMENT (OUTPATIENT)
Dept: BEHAVIORAL HEALTH | Facility: CLINIC | Age: 34
End: 2024-09-10
Payer: COMMERCIAL

## 2024-09-11 ENCOUNTER — APPOINTMENT (OUTPATIENT)
Dept: OBSTETRICS AND GYNECOLOGY | Facility: CLINIC | Age: 34
End: 2024-09-11
Payer: COMMERCIAL

## 2024-09-12 ENCOUNTER — APPOINTMENT (OUTPATIENT)
Dept: OBSTETRICS AND GYNECOLOGY | Facility: CLINIC | Age: 34
End: 2024-09-12
Payer: COMMERCIAL

## 2024-09-13 ENCOUNTER — APPOINTMENT (OUTPATIENT)
Dept: OBSTETRICS AND GYNECOLOGY | Facility: CLINIC | Age: 34
End: 2024-09-13
Payer: COMMERCIAL

## 2024-09-16 ENCOUNTER — APPOINTMENT (OUTPATIENT)
Dept: OBSTETRICS AND GYNECOLOGY | Facility: CLINIC | Age: 34
End: 2024-09-16
Payer: COMMERCIAL

## 2024-09-16 VITALS
DIASTOLIC BLOOD PRESSURE: 52 MMHG | WEIGHT: 229 LBS | BODY MASS INDEX: 32.78 KG/M2 | SYSTOLIC BLOOD PRESSURE: 118 MMHG | HEIGHT: 70 IN

## 2024-09-16 DIAGNOSIS — R10.2 PERINEAL PAIN IN FEMALE: Primary | ICD-10-CM

## 2024-09-16 DIAGNOSIS — K59.01 SLOW TRANSIT CONSTIPATION: ICD-10-CM

## 2024-09-16 DIAGNOSIS — Z87.59 HISTORY OF PRE-ECLAMPSIA: ICD-10-CM

## 2024-09-16 PROBLEM — Z34.93 ENCOUNTER FOR SUPERVISION OF NORMAL PREGNANCY IN THIRD TRIMESTER: Status: RESOLVED | Noted: 2023-12-18 | Resolved: 2024-09-16

## 2024-09-16 PROBLEM — Z34.90 ENCOUNTER FOR INDUCTION OF LABOR: Status: RESOLVED | Noted: 2024-08-08 | Resolved: 2024-09-16

## 2024-09-16 PROBLEM — O99.210 OBESITY AFFECTING PREGNANCY, ANTEPARTUM (HHS-HCC): Status: RESOLVED | Noted: 2024-02-05 | Resolved: 2024-09-16

## 2024-09-16 PROBLEM — O99.343 DEPRESSION DURING PREGNANCY IN THIRD TRIMESTER (HHS-HCC): Status: RESOLVED | Noted: 2024-03-05 | Resolved: 2024-09-16

## 2024-09-16 PROBLEM — F32.A DEPRESSION DURING PREGNANCY IN THIRD TRIMESTER (HHS-HCC): Status: RESOLVED | Noted: 2024-03-05 | Resolved: 2024-09-16

## 2024-09-16 ASSESSMENT — PAIN SCALES - GENERAL: PAINLEVEL: 3

## 2024-09-16 ASSESSMENT — EDINBURGH POSTNATAL DEPRESSION SCALE (EPDS)
I HAVE FELT SAD OR MISERABLE: YES, QUITE OFTEN
I HAVE BLAMED MYSELF UNNECESSARILY WHEN THINGS WENT WRONG: NO, NEVER
I HAVE LOOKED FORWARD WITH ENJOYMENT TO THINGS: RATHER LESS THAN I USED TO
I HAVE BEEN ABLE TO LAUGH AND SEE THE FUNNY SIDE OF THINGS: NOT QUITE SO MUCH NOW
THINGS HAVE BEEN GETTING ON TOP OF ME: YES, SOMETIMES I HAVEN'T BEEN COPING AS WELL AS USUAL
I HAVE BEEN SO UNHAPPY THAT I HAVE HAD DIFFICULTY SLEEPING: NOT AT ALL
I HAVE FELT SCARED OR PANICKY FOR NO GOOD REASON: NO, NOT MUCH
TOTAL SCORE: 11
THE THOUGHT OF HARMING MYSELF HAS OCCURRED TO ME: NEVER
I HAVE BEEN ANXIOUS OR WORRIED FOR NO GOOD REASON: YES, SOMETIMES
I HAVE BEEN SO UNHAPPY THAT I HAVE BEEN CRYING: YES, QUITE OFTEN

## 2024-09-16 NOTE — PROGRESS NOTES
Post Partum Visit  Subjective    Zulema Oliveira is a 34 y.o.  presenting for postpartum follow-up:    Delivery Date: 2024  GA at Delivery: 40 wk 3 d  Type of Delivery: Vaginal, Spontaneous   Baby's Name: Keo  Baby's Wt: 10 lb 3 oz    Pregnancy was complicated by:  Pregnancy Problems (from 23 to present)       Problem Noted Resolved    Depression during pregnancy in third trimester ([facility]) 3/5/2024 by Samir Arrieta, PhD No    Priority:  Medium      Overview Signed 2024  1:05 PM by Tito Watson MD     Following with behavioral health         Encounter for supervision of normal pregnancy in third trimester ([facility]) 2023 by Tito Watson MD No    Priority:  Medium      Overview Addendum 2024  8:43 AM by Tito Watson MD     GBS Neg  Feeling well, occasional contractions but nothing regular. Good FM, no VB or LOF    Offered membrane sweep - reviewed risks/benefits, verbal consent obtained  SVE 3/70/-3  Membranes swept  [facility]  Mom starting Chemo likely week of  - desires IOL appointment by  so she can be there - requested, will notify with final date         Encounter for induction of labor ([facility]) 2024 by Grzegorz Blake MD No    Obesity affecting pregnancy, antepartum ([facility]) 2024 by Angi Corona MD No    Overview Signed 2024  1:08 PM by Tito Watson MD     Pregravid BMI: 28.70  No indication for growth/ ANT           Breech presentation ([facility]) 2024 by Tito Watson MD 2024 by Tito Watson MD    Overview Addendum 2024  9:29 AM by Tito Watson MD     Breech at 30w growth  Discussed likelihood of flipping by 37 w and plan to re-check then  Info on Spinning Babies provided at patient request  Feels llike bvaby has flipped  [place]opold today with         Nausea and vomiting in pregnancy prior to 22 weeks gestation ([facility]) 1/15/2024 by Tito Watson MD 2024 by  Tito Watson MD    Severe pre-eclampsia in third trimester ([facility]) 2022 by Angi Corona MD 2024 by Angi Corona MD            Concerns: still having discomfort; unable to sit on toilet for longer than 1-2 minutes. Also reports decreased feeling and tightness in perineum/vagina. Overall Achey feeling. PFPT scheduled next week.    Pain: 3  Lacerations:   Laceration Repaired?   Perineal: 2nd Yes   Periurethral:       Labial:       Sulcus:       Vaginal:       Cervical:           Repair suture: 3-0 Synthetic Suture   Number of repair packets:     Blood loss (mL):     Total estimated blood loss (mL): 0     Menses: No  Sexual Health Concerns: No  Contraceptive Method: LNG IUD later - too uncomfortable today    Feeding Method: She is breast feeding exclusively. no breast or nursing problems  Lactation Consult Needed?: No    Birth Trauma: No  Baby:   Information for the patient's :  Keo Oliveira [[id number]]   Keo Oliveira,   Information for the patient's :  Keo Oliveira [[id number]]   male  Bonding: doing well without issue    Mood:   EPDS 11          Objective   Vitals:    24 1604   BP: 118/52      Physical Exam  Vitals reviewed. Exam conducted with a chaperone present.   Constitutional:       Appearance: Normal appearance.   HENT:      Head: Normocephalic.   Cardiovascular:      Rate and Rhythm: Normal rate and regular rhythm.   Pulmonary:      Effort: Pulmonary effort is normal. No respiratory distress.   Abdominal:      General: There is no distension.      Palpations: Abdomen is soft. There is no mass.      Tenderness: There is no abdominal tenderness. There is no guarding or rebound.   Genitourinary:     Comments: Normal appearing external female genitalia. Vulva without lesions. Vaginal mucosa normal appearing with physiologic discharge and no lesions. Cervix normal appearing without lesions.     Perineal laceration site well healed with mild tenderness consistent with  continued healing. Small skin bridge present at 6:00 of introitus.  Skin:     General: Skin is warm and dry.   Neurological:      General: No focal deficit present.      Mental Status: She is alert.   Psychiatric:         Mood and Affect: Mood normal.         Behavior: Behavior normal.         Thought Content: Thought content normal.         Judgment: Judgment normal.                Assessment/Plan   Problem List Items Addressed This Visit             ICD-10-CM       Physical Therapy - Physical Therapy - June 2024    Perineal pain in female - Primary R10.2     Continue planned pelvic floor physical therapy  Can consider revision of skin bridge if no improvement, though optimistic that symptoms will improve with time and therapy            Other    History of pre-eclampsia Z87.59     Normotensive today. OK to discontinue BP monitoring at home.         Slow transit constipation K59.01     Recommend senna daily, miralax daily prn         Vaginal delivery ([facility]) O80     Meeting milestones, cleared to resume normal ADLs  Desired IUD at future visit (due to perineal/pelvic discomfort), declines bridge  To contact clinic to schedule IUD placement

## 2024-09-16 NOTE — ASSESSMENT & PLAN NOTE
Continue planned pelvic floor physical therapy  Can consider revision of skin bridge if no improvement, though optimistic that symptoms will improve with time and therapy

## 2024-09-16 NOTE — ASSESSMENT & PLAN NOTE
Meeting milestones, cleared to resume normal ADLs  Desired IUD at future visit (due to perineal/pelvic discomfort), declines bridge  To contact clinic to schedule IUD placement

## 2024-09-24 ENCOUNTER — APPOINTMENT (OUTPATIENT)
Dept: BEHAVIORAL HEALTH | Facility: CLINIC | Age: 34
End: 2024-09-24
Payer: COMMERCIAL

## 2024-09-26 ENCOUNTER — TREATMENT (OUTPATIENT)
Dept: PHYSICAL THERAPY | Facility: CLINIC | Age: 34
End: 2024-09-26
Payer: COMMERCIAL

## 2024-09-26 DIAGNOSIS — M25.552 HIP PAIN, LEFT: Primary | ICD-10-CM

## 2024-09-26 DIAGNOSIS — R10.2 PERINEAL PAIN IN FEMALE: ICD-10-CM

## 2024-09-26 DIAGNOSIS — N94.89 LABIAL PAIN: ICD-10-CM

## 2024-09-26 PROCEDURE — 97110 THERAPEUTIC EXERCISES: CPT | Mod: GP | Performed by: PHYSICAL THERAPIST

## 2024-09-26 NOTE — PROGRESS NOTES
Physical Therapy Evaluation and Treatment      Patient Name: Zulema Oliveira  MRN: [id number]  Today's Date: 2024  Time Calculation  Start Time: 0507  Stop Time: 605  Time Calculation (min): 58 min         PT Therapeutic Procedures Time Entry  Manual Therapy Time Entry: 2  Therapeutic Exercise Time Entry: 55                   INSURANCE:  Visit number:    EMP CONSUMER [facility] 3500 DED FAMILY, OOP 5100, 90/10 COVERAGE, 30V PT/OT COMBINED, AVAILITY #[id number] Transaction Time , 9:19 AM    [facility]IGO APPROVED 10 VISITS FROM 2024 TO 2024 AUT # [id number]  Encounter for supervision of normal pregnancy in first trimester, unspecified  ([facility]) Z34.91 // Nicole confirmed 6/3/24 11:17am     Referring Provider: Tito Watson MD    ASSESSMENT:  PT Assessment Results: decreased knowledge of HEP, activity limitations, ADLs/IADLs/self care skills, flexibility, motor function/control/tone, pain, participation restrictions, range of motion/joint mobility, strength, posture, transfers, coordination, balance, gait/locomotion, integumentary, decreased knowledge of precautions.  Rehab Prognosis: Good  Evaluation/Treatment Tolerance: Patient tolerated treatment well  Stable and/or uncomplicated characteristics    Zulema Oliveira is a 34 y.o. female presenting to the clinic with pelvic pain post vaginal delivery on 24. Pt demonstrates decreased ROM and strength of the pelvis/B hips and abdominals causing pain and dysfunction with BM, sitting on the toilet, squatting, and lifting. Pt was given and reviewed HEP including stretching and strengthening. The patient was educated on the importance of general therapeutic exercise, anatomy, function, & likely cause of impairments. Pt will benefit from skilled PT in order to increase ROM and strength of the pelvis/B hips and abdominals so that the pt can perform ADLs without pain and return to PLOF.     PLAN:  Treatments/Interventions: traction, gait  "training, dry needling, edema control, education/instruction, home program, self care/home management, manual therapy, neuromuscular re-education, therapeutic activities, therapeutic exercises, modalities, therapeutic elastic taping.   PT Plan: Skilled PT  PT Frequency: 1 time per week  Duration: 3 visits  Rehab Potential: Good  Plan of Care Agreement: Patient    Goals -    STG - After about 6 weeks:  Pt will report less than a 4/10 pain at the worst.  Pt will be able to manage changes in intra-abdominal pressure with appropriate pelvic floor and TA muscle activation.  Pt will be able to coordinate pelvic floor with thoracic diaphragm during functional activities that cause symptoms.  Pt will increase by 1 MMT grade for B hips.    LTG - after about 12 weeks:  Pt will report less than a 0-2/10 pain at the worst.  Pt will have at least 4+/5 to 5/5 strength for B hips.   Pt will have AROM to WFL for the lumbar spine.   Patient will demonstrate good lumbo-pelvic dissociation to increase postural awareness and alignment for toileting and strengthening.   Patient will demonstrate the ability to contract-relax-and eccentrically lengthen the pelvic floor.   Patient will report intercourse without discomfort to improve QoL.    Patient will be knowledgeable of healthy bowel/bladder habits measured by teach back.   Pt will report a significant improvement with Female NIH-CPSI score by 5 points (MDC).  Pt will be independent with progressed HEP.    CURRENT PROBLEM:   1. Hip pain, left        2. Labial pain        3. Perineal pain in female            Subjective      PELVIC HISTORY:  History of Current Episode/Chief Complaint -    Delivery Date: 8/9/2024  GA at Delivery: 40 wk 3 d  Type of Delivery: Vaginal, Spontaneous   Baby's Name: Keo  Baby's Wt: 10 lb 3 oz    Pt is coming back to PFPT after having her son through vaginal delivery. Pt states that it was \"pretty rough\" in the beginning since he was a pretty large baby. Pt " did have some tearing and loss of feeling in her pelvic floor. Pt now notes that she gets a lot of pain when taking a BM especially if she is sitting there for a while. Pt also reports that she cannot tell how much she is going. Pt does not report any leaking at this time. Pt does report that her perineal body feels hard and tight. Pt reports that she does still have some stitches that have not dissolved yet.    Mechanism of Injury -    Insidious Onset    Pelvic Pain -   Pain when emptying bladder: lack of feeling  Pain with wiping or tight clothing: No  Pain with intercourse: Not Yet  History of back pain: Yes    Pain Location: Lumbar, Pelvic Floor/Vaginal region (B), Butt cheeks (R)  Pain Best: 0/10  Pain Today: 0-4/10 though out the day; 7-8/10 when BM  Pain Worst: 8/10   Pain Type: Intermittent, Achy/Dull, Sharp, Stiffness/Tightness, Numbness    Pain Exacerbating Factors: BM, sitting on the toilet, squatting, and lifting.  Pain Relieving Factors: Changing positions, Tylenol, Ice    Difficulty Sleeping: No  Night Sweats, Loss of Appetite, Unexpected Weight-loss: No  Saddle Anesthesia: Yes    Pelvic Exercise -   Do you do Kegels? Yes, Painful; educated to stop for now.   Current exercise regime: Walking    Bladder Hx -   Excessive Urinary Urgency: No  Water Consumption a day: Trying to drink more, 30 oz about  Daytime Voiding Frequency: 4-5  Nighttime Voiding Frequency: 0-1  Unintentional urine loss frequency: 0  Difficulty initiating flow of urine: No  Slow/weak urine stream: No  Do you push to urinate: Yes  Able to completely empty bladder: Sometimes no    Bowel Hx -   Excessive Bowel Urgency: Yes  BM Frequency: every other  Frequent Diarrhea: No  Frequent constipation/straining/incomplete emptying: Yes, with hemorrhoids   Supplements: Colase, MiraLax  Unintentional stool loss: No, but did in the beginning    Work -   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    Patient Stated Goals -   Reduce pain, feel normal    PRECAUTIONS -    None    Prior Level of Function -    I with ADLs/IADLs     Objective     Hip AROM (degrees) - WFL grossly except reduced IR and extension B    Hip MMT (/5) -  R hip Flexion: 5   R hip ER: 4   R hip IR: 4   R hip Abduction: 4   R hip Adduction: 4+  L hip Flexion: 4+    L hip ER: 4   L hip IR: 4   L hip Abduction: 4   L hip Adduction: 4+    Lumbar AROM -   Lumbar Flexion: 80%  Lumbar Extension: 50%  R Lumbar Side Bendin%  L Lumbar Side Bendin%    Posture -   Increased Lumbar Lordosis/APT    Functional Assessment/Special Tests -  Trendelenburg positive bilateral  Breathing: Educated on diaphragmatic breathing  MICHELLE positive bilateral L>R  Reid positive bilateral  SLR negative bilateral    Flexibility -   Decreased Tissue Length of: Iliopsoas, Adductors, HS     Palpation - Consent to External Palpation Verbally Given    Diastasis Recti: 1.5 finger length at Umbilicus, 0 finger length 1” below, 1 finger length 1” above  Pelvic Alignment: Slightly inferior R malleolus - corrected with shotgun    Internal Palpation Exam - Plan to perform next visit with patient consent    Outcome Measures -   Other Measures  Other Outcome Measures: Female NIH-CPSI: 22 (Pain 12, Urinary 2, QOL 8)   Female NIH-CPSI: 22 (Pain 12, Urinary 2, QOL 8)    Treatment -   Therapeutic Exercise (92532) -     Re-Assessment  Modified Reid Stretch: 30 sec B  Supine Piriformis Stretch with Foot on Ground: 30 sec B  Bridge on Heels: x5  Hip IR/ER Wipers: x5 B  Education on Urge Control, Bladder Irritants, Elimination Position, Colonic ILU Massage, Constipation Management  Manual Therapy (20633) -    Shotgun    OP Patient Education -   Access Code: [email or web address]  URL: [email or web address]  Date: 2024  Prepared by: Maggie Mccormack    Exercises  - Modified Reid Stretch  - 1-2 x daily - 3 sets - 30 seconds hold  - Supine  Piriformis Stretch with Foot on Ground  - 1-2 x daily - 3 sets - 30 sec hold  - Bridge on Heels  - 1-2 x daily - 3 sets - 10 reps - 2 sec hold  - Supine Hip Internal and External Rotation  - 1-2 x daily - 10 reps - 2-3 sec hold    Handouts  - Urge Control Handout   - Bladder Irritants  - Elimination Position Handout   - Colonic ILU Massage Handout   - Constipation Management Handout

## 2024-10-03 ENCOUNTER — TREATMENT (OUTPATIENT)
Dept: PHYSICAL THERAPY | Facility: CLINIC | Age: 34
End: 2024-10-03
Payer: COMMERCIAL

## 2024-10-03 DIAGNOSIS — R10.2 PERINEAL PAIN IN FEMALE: ICD-10-CM

## 2024-10-03 DIAGNOSIS — M25.552 HIP PAIN, LEFT: Primary | ICD-10-CM

## 2024-10-03 DIAGNOSIS — N94.89 LABIAL PAIN: ICD-10-CM

## 2024-10-03 PROCEDURE — 97140 MANUAL THERAPY 1/> REGIONS: CPT | Mod: GP | Performed by: PHYSICAL THERAPIST

## 2024-10-03 PROCEDURE — 97110 THERAPEUTIC EXERCISES: CPT | Mod: GP | Performed by: PHYSICAL THERAPIST

## 2024-10-03 NOTE — PROGRESS NOTES
Physical Therapy Treatment    Patient Name: Zulema Oliveira  MRN: [id number]  Today's Date: 10/3/2024  Time Calculation  Start Time: 315  Stop Time: 410  Time Calculation (min): 55 min       PT Therapeutic Procedures Time Entry  Manual Therapy Time Entry: 32  Therapeutic Exercise Time Entry: 23                   INSURANCE:  Visit number:    EMP CONSUMER SELECT 3500 DED FAMILY, OOP 5100, 90/10 COVERAGE, 30V PT/OT COMBINED, AVAILITY #[id number] Transaction Time , 9:19 AM    [facility] APPROVED 10 VISITS FROM 2024 TO 2024 AUT # [id number]  Encounter for supervision of normal pregnancy in first trimester, unspecified  ([facility]) Z34.91 // Nicole confirmed 6/3/24 11:17am      Referring Provider: Tito Watson MD    CURRENT PROBLEM:  1. Hip pain, left        2. Labial pain        3. Perineal pain in female          SUBJECTIVE:   General -   Pt is doing home exercises.  Pt did notice some bleeding with BMs. Pt states that she did just start with fiber yesterday, and has been trying to drink more water; pt states that she was able to do better with straws. Pt does still feel like she has a loss of feeling and that her perineal body still feels tight.    Pain -    Pain Today: 7/10 rectum, Perineal body 2/10    Precautions -    None    OBJECTIVE:     Internal Palpation Exam - Consent to Pelvic Floor Internal Exam Verbally Given, Performed using universal precautions/gloves    Pelvic Floor External Visual Observation -   Contraction with Anal [place]: present  Bearing Down with distension around anus: present  Cough with Contraction: Slight Bulge    External Palpation -   Perineal Body: moderate TTP  Superficial Transverse Perineal Muscles: (R) mild TTP; (L) no TTP    Visual Observation -   Good Tissue Color: Yes  Ornelas of Clitoris has good movement: Yes    Internal Palpation -   Ischiocavernosus: (R) mild TTP; (L) mild-moderate TTP  Bulbocavernosus: (R) mild-moderate TTP; (L) moderate TTP 4-6  worst  Periurethrals: (R) mild TTP; (L) no TTP  Levator Ani: (R) moderate TTP; (L) moderate TTP  Obturator Internus: (R) moderate-severe TTP; (L) mild TTP    Treatment -   Therapeutic Exercise (17788) -  Cat Cow: x10  Quadruped TA: 5 sec x10  Happy Baby Supine and Seated: 1 min  Manual Therapy (49931) -    Internal Pelvic Floor Assessment - Gentle TPR    OP Patient Education -   Access Code: [email or web address]  URL: [email or web address]  Date: 10/03/2024  Prepared by: Maggie Mccormack  Added Exercises:  - Cat Cow  - 1-2 x daily - 2-3 sets - 10 reps  - Quadruped Transversus Abdominis Bracing  - 1-2 x daily - 2 sets - 10 reps - 5 sec hold  - Supine Pelvic Floor Stretch  - 1-2 x daily - 1-2 min hold  - Seated Happy Baby With Trunk Flexion For Pelvic Relaxation  - 1-2 x daily - 1-2 min hold    ASSESSMENT:     Internal pelvic floor assessment was performed with patient consent and gentle TPR for reduced pain/tone and spasm. Pt was introduced to more pelvic floor relaxation exercises this visit with good tolerance. Pt was also introduced to TA activations for improved management of intraabdominal pressure. Pt tolerated session well and is progressing towards functional needs. Continue to progress pt within tolerance and POC.    PLAN:    Continue to progress pt within tolerance. Assess response to internal pelvic floor assessment.

## 2024-10-08 ENCOUNTER — APPOINTMENT (OUTPATIENT)
Dept: BEHAVIORAL HEALTH | Facility: CLINIC | Age: 34
End: 2024-10-08
Payer: COMMERCIAL

## 2024-10-08 DIAGNOSIS — F41.1 GENERALIZED ANXIETY DISORDER: Primary | Chronic | ICD-10-CM

## 2024-10-08 PROCEDURE — 90837 PSYTX W PT 60 MINUTES: CPT | Performed by: PSYCHOLOGIST

## 2024-10-08 NOTE — PROGRESS NOTES
Behavioral Medicine Psychotherapy Progress Note      IDENTIFYING AND REFERRAL INFORMATION:  Zulema Oliveira is a 34 y.o. able-bodied employed,  White female parent of one who is currently pregnant and dealing with exacerbation of depressive symptoms. She presented for follow-up.     Start Time: 2 pm  End Time: 3 pm  Time Spent with Patient: 60 minutes    Session number 10 with this psychologist.    This is a virtual video visit.     Telephone/Televideo Informed Consent for Psychotherapy was reviewed with the patient as needed. The purpose of the meeting was reviewed as indicated, and limits of confidentiality reviewed as needed. Patient stated an understanding of the information and agreed to the session.       Symptoms: anxiety, low mood, crying more easily than usual, feeling inadequate, ruminating on not being a good enough parent, fatigue (some pregnancy related), intermittent guilt.      Focus of treatment: identifying unhelpful responses and modifying them to be more adaptive, self-supportive       Session Content:  She said she is doing generally okay. Some days are better than others. She is noticing she will feel down and more easily overwhelmed when sleep deprived. Her  wakes up often and she will sometimes only sleep about 2 hours at a time. She is trying to maintain breast milk production so she pumps at night when  does bottle feeding, if she is not breastfeeding. It is important for her to maintain breast feeding as long as she can so is willing to have the trade off of fatigue and emotional lability. She indicated she does have time she is self-critical, like when her toddler is in front of the TV for an extended time during the day or she gets overwhelmed and too irritated and yells. She noted guilt can show up often when she reflects on her actions, and she feels like she should be doing better.     Provider empathized, explored circumstances and pressures on her and if he  "expectations are realistic. Discussed options for building self-compassion and explored examples of her decision making when her  is around as well as decision making when solo. She mentioned she rarely steps away from her  and kids to do something of her own at home, out of concern for overburdening her . Through discussion she indicated she does not want to focus on possibly burdening him in her decision making, because she realized that also implies that he cannot manage parenting their kids on his own. She trusts that he can and wants to make choices more consistent with that trust/knowledge. Provider explored what that may look like with her.     Provider explored this ruminative response pattern further and discussed other worry management tools including behavioral and cognitive coping tools. Discussed normalizing mistakes as part of the practice; she said this is a challenge and a work in progress.     Additional historical information:   She wants to continue the appreciation journal, as a regular practice of recognizing her appreciation for her efforts in a day (up to 3 examples). She also continues to remind herself of the \"good enough parent\" as a means of normalizing having limits to one's capacity/abilities.    , two children. Son Nakul is two and has  son. 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     Current Psychiatric Medications:  None noted.     Objective:  Mental Status  Orientation and consciousness: [x ]oriented X 3 and attentive     [ ]other, explain:   Appearance/grooming :    [x ]appropriate [ ]poor grooming/hygiene bizarre appearance    [ ]other, explain:    Behavior: [x ]appropriate to context. Easily teary.   [ ]inappropriate and/or bizarre, explain:    Speech: [x]normal rate/rhythm [ ]pressured speech []slow    [ ]other,    Language: [x ]normal    [ ]abnormalities noted, explain:    Mood: [ ]euthymic [ " ]depressed [ ]dysphoric [ x]anxious    [ ]euphoric [ ] other   Affect: [x]mood congruent. Appropriately teary and sad during session.   []incongruent, explain:    Evidence of perceptual disturbances (hallucinations, illusions, etc.):    [ x]no    [ ]yes, explain:   Thought processes:     [x ]normal and congruent     [ ]other, explain:    Insight: [ ]good [x ]adequate [ ]poor []questionable   Judgment: [ ]good [x ]adequate [ ]poor []questionable   Fund of Knowledge:[ ]above average [x ]average [ ]below average    Risk Assessment  Homicidal intentions: no  Homicidal plan: no  Suicidal intentions: no  Suicidal plan: no  Risk of harm low at this time.       Therapeutic Intervention:   [ x] Psychosocial  [ ] Treatment Goals  [ x] Cognitive/Behavioral- included changing negative self-talk, identifying idstorted thinking, working on A-B-C's exercise (antecedent-behavior-consequence).   [ x] Psychoeducation   [ ] Insight/Psychodynamic  [ ] Problem solving  [ x] Supportive  [ ] Referral to additional/other treatment/resources      Diagnosis:   ERINN  depressive disorder, unspecified, during pregnancy    Treatment Plan/Recommendations:   rtc in about 2 weeks for individual psychotherapy with this psychologist via video telehealth. Patient agreed to appointment frequency and plan. Patient has scheduling contact info to use as needed. Scheduling staff alerted.     Provider emailed resources after previous session- distorted thinking list and an ABC table. Will revisit when applicable.     Resume appreciation journal when able.     Prognosis: good    Progress to date: some, as noted.       Samir Arrieta, PhD

## 2024-10-10 ENCOUNTER — TREATMENT (OUTPATIENT)
Dept: PHYSICAL THERAPY | Facility: CLINIC | Age: 34
End: 2024-10-10
Payer: COMMERCIAL

## 2024-10-10 DIAGNOSIS — R10.2 PERINEAL PAIN IN FEMALE: ICD-10-CM

## 2024-10-10 DIAGNOSIS — N94.89 LABIAL PAIN: ICD-10-CM

## 2024-10-10 DIAGNOSIS — M25.552 HIP PAIN, LEFT: Primary | ICD-10-CM

## 2024-10-10 PROCEDURE — 97110 THERAPEUTIC EXERCISES: CPT | Mod: GP | Performed by: PHYSICAL THERAPIST

## 2024-10-10 PROCEDURE — 97140 MANUAL THERAPY 1/> REGIONS: CPT | Mod: GP | Performed by: PHYSICAL THERAPIST

## 2024-10-10 NOTE — PROGRESS NOTES
Physical Therapy Treatment    Patient Name: Zulema Oliveira  MRN: [id number]  Today's Date: 10/10/2024  Time Calculation  Start Time: 514  Stop Time: 610  Time Calculation (min): 56 min       PT Therapeutic Procedures Time Entry  Manual Therapy Time Entry: 30  Therapeutic Exercise Time Entry: 23                   INSURANCE:  Visit number:    EMP CONSUMER SELECT 3500 DED FAMILY, OOP 5100, 90/10 COVERAGE, 30V PT/OT COMBINED, AVAILITY #[id number] Transaction Time , 9:19 AM    [facility]O APPROVED 10 VISITS FROM 2024 TO 2024 AUT # [id number]  Encounter for supervision of normal pregnancy in first trimester, unspecified  ([facility]) Z34.91 // Nicole confirmed 6/3/24 11:17am      Referring Provider: Tito Watson MD    CURRENT PROBLEM:  1. Hip pain, left        2. Labial pain        3. Perineal pain in female          SUBJECTIVE:   General -   Pt is doing home exercises.  Little sore when crouching.  Pain is usually by rectum, but noticed closer to her vagina this time.  BMs about the same, trying to increase water and fiber intake.  Pt was a little sore after the internal assessment, but better the next day.    Pain -    Pain Location/Description: Pelvic floor   Pain Today: 5/10    Precautions -    None    OBJECTIVE:   Internal Palpation Exam - Consent to Pelvic Floor Internal Exam Verbally Given, Performed using universal precautions/gloves    Ischiocavernosus: (R) moderate TTP; (L) mild TTP  Bulbocavernosus: (R) mild TTP; (L) mild TTP  Periurethrals: (R) moderate TTP; (L) mild TTP  Levator Ani: (R) moderate TTP; (L) moderate TTP  Obturator Internus: (R) moderate TTP; (L) moderate TTP  (More Sharp feeling on R side and more Achy feeling on L side today)    Treatment -   Therapeutic Exercise (68112) -  Quadruped TA activation: 5 sec x5  Quadruped TA LE ext: x10 B  Bridges 3-ways: x10 ea  Tissue Science Education  Manual Therapy (37480) -    Internal Pelvic Floor Assessment - Gentle  TPR  Cupping gliding to abdominals/adductors/inguinal region and superior cluneals/QL/lumbosacral region    OP Patient Education -   Access Code: [email or web address]  URL: [email or web address]  Date: 10/10/2024  Prepared by: Maggie Mccormack  Added Exercises  - Beginner Front Arm Support Alternating hip ext - 1-2 x daily - 2 sets - 10 reps    Handouts:  - Bridges 3-ways    ASSESSMENT:     Internal pelvic floor assessment was performed with patient consent and gentle TPR for reduced pain/tone and spasm. Cupping performed to anterior and posterior musculature associated with neurology and biomechanics of the pelvic floor with gliding for reduced pain and spasm. Pt was progressed to bridges 3-ways and quadruped hip extensions for more glute activation and support of pelvic floor. Pt tolerated session well and is progressing towards functional needs. Continue to progress pt within tolerance and POC.    PLAN:    Continue to progress pt within tolerance. Assess response to cupping/internal pelvic floor assessment.

## 2024-10-17 ENCOUNTER — APPOINTMENT (OUTPATIENT)
Dept: PHYSICAL THERAPY | Facility: CLINIC | Age: 34
End: 2024-10-17
Payer: COMMERCIAL

## 2024-10-22 ENCOUNTER — APPOINTMENT (OUTPATIENT)
Dept: BEHAVIORAL HEALTH | Facility: CLINIC | Age: 34
End: 2024-10-22
Payer: COMMERCIAL

## 2024-10-24 ENCOUNTER — TREATMENT (OUTPATIENT)
Dept: PHYSICAL THERAPY | Facility: CLINIC | Age: 34
End: 2024-10-24
Payer: COMMERCIAL

## 2024-10-24 DIAGNOSIS — Z34.91 ENCOUNTER FOR SUPERVISION OF NORMAL PREGNANCY IN FIRST TRIMESTER, UNSPECIFIED GRAVIDITY: ICD-10-CM

## 2024-10-24 DIAGNOSIS — M25.552 HIP PAIN, LEFT: Primary | ICD-10-CM

## 2024-10-24 DIAGNOSIS — R10.2 PERINEAL PAIN IN FEMALE: ICD-10-CM

## 2024-10-24 DIAGNOSIS — N94.89 LABIAL PAIN: ICD-10-CM

## 2024-10-24 PROCEDURE — 97110 THERAPEUTIC EXERCISES: CPT | Mod: GP | Performed by: PHYSICAL THERAPIST

## 2024-10-24 PROCEDURE — 97140 MANUAL THERAPY 1/> REGIONS: CPT | Mod: GP | Performed by: PHYSICAL THERAPIST

## 2024-10-24 NOTE — PROGRESS NOTES
Physical Therapy Treatment    Patient Name: Zulema Oliveira  MRN: [id number]  Today's Date: 10/24/2024  Time Calculation  Start Time: 1114  Stop Time: 1211  Time Calculation (min): 57 min       PT Therapeutic Procedures Time Entry  Manual Therapy Time Entry: 32  Therapeutic Exercise Time Entry: 23                   INSURANCE:  Visit number:    EMP CONSUMER SELECT 3500 DED FAMILY, OOP 5100, 90/10 COVERAGE, 30V PT/OT COMBINED, AVAILITY #[id number] Transaction Time , 9:19 AM    CONTIGO APPROVED 10 VISITS FROM 2024 TO 2024 AUT # [id number]  Encounter for supervision of normal pregnancy in first trimester, unspecified  ([facility]) Z34.91 // Nicole confirmed 6/3/24 11:17am      Referring Provider: Tito Watson MD    CURRENT PROBLEM:  1. Hip pain, left  Follow Up In Physical Therapy      2. Perineal pain in female        3. Encounter for supervision of normal pregnancy in first trimester, unspecified   Follow Up In Physical Therapy      4. Labial pain  Follow Up In Physical Therapy        SUBJECTIVE:   General -   Pt is doing home exercises.  Bad BM the Friday after her last appointment and her pain lasted her whole next week. Pt reports that the stool was very bulky and had to use her finger to help. Stopped metamucil due to bulking, just doing miralax and senna. Pt then had to cancel her last appointment due to childcare and emailed. Pt is having a lot of rectal pain and pain when passing gas even. Pt reports that she did feel good after the cupping last visit.    Pain -    Pain Location/Description: Rectal and Perianal region  Pain Today: 4/10 rectal, labia/perianal pain 3/10    Precautions -    None    OBJECTIVE:     Internal Palpation Exam - Consent to Pelvic Floor Internal Exam Verbally Given, Performed using universal precautions/gloves    Ischiocavernosus: (R) mild TTP; (L) moderate TTP  Bulbocavernosus: (R) mild TTP; (L) moderate TTP 4-5 most  Periurethrals: (R) mild  TTP; (L) severe TTP 4-5 most  Levator Ani: (R) mild TTP; (L) moderate TTP  Obturator Internus: (R) mild TTP; (L) moderate TTP    Treatment -   Therapeutic Exercise (36603) -  Pudendal Nerve Glides: x10  Education on Pelvic Wand and Female Internal Stretching  Pain Science and Tissue Science Education   Education on Dry Needling  Manual Therapy (67463) -    Internal Pelvic Floor Assessment - Gentle TPR  Cupping gliding to abdominals/adductors/inguinal region and superior cluneals/QL/lumbosacral region    OP Patient Education -   Handouts  - Female Internal Stretching Guide  - Pudendal Nerve Glides  - Dry Needling Information Handout    ASSESSMENT:     Cupping performed before internal exam for reduced spasm. Internal pelvic floor assessment was performed with patient consent and gentle TPR for reduced pain/tone and spasm. Pt was educated on dry needling and given an information sheet. Pt was also given instructions for self female internal stretching at home. Pt tolerated session well and is progressing towards functional needs. Continue to progress pt within tolerance and POC.    PLAN:   Continue to progress pt within tolerance. Assess response to internal pelvic floor assessment. Plan to try dry needling next visit.    Treatments/Interventions: gait training, traction, dry needling, edema control, education/instruction, home program, self care/home management, manual therapy, neuromuscular re-education, therapeutic activities, therapeutic exercises, modalities, therapeutic elastic taping.   PT Plan: Skilled PT  PT Frequency: 1 time per week  Duration: 3 visits  Rehab Potential: Good  Plan of Care Agreement: Patient    Goals -    STG - After about 6 weeks:  Pt will report less than a 4/10 pain at the worst. (Goal PROGRESSING)    Pt will be able to manage changes in intra-abdominal pressure with appropriate pelvic floor and TA muscle activation. (Goal PROGRESSING)    Pt will be able to coordinate pelvic floor with  thoracic diaphragm during functional activities that cause symptoms. (Goal PROGRESSING)    Pt will increase by 1 MMT grade for B hips. (Goal PROGRESSING)       LTG - after about 12 weeks:  Pt will report less than a 0-2/10 pain at the worst.  Pt will have at least 4+/5 to 5/5 strength for B hips.   Pt will have AROM to WFL for the lumbar spine.   Patient will demonstrate good lumbo-pelvic dissociation to increase postural awareness and alignment for toileting and strengthening.   Patient will demonstrate the ability to contract-relax-and eccentrically lengthen the pelvic floor.   Patient will report intercourse without discomfort to improve QoL.    Patient will be knowledgeable of healthy bowel/bladder habits measured by teach back.   Pt will report a significant improvement with Female NIH-CPSI score by 5 points (MDC).  Pt will be independent with progressed HEP.

## 2024-10-31 ENCOUNTER — TREATMENT (OUTPATIENT)
Dept: PHYSICAL THERAPY | Facility: CLINIC | Age: 34
End: 2024-10-31
Payer: COMMERCIAL

## 2024-10-31 DIAGNOSIS — Z34.91 ENCOUNTER FOR SUPERVISION OF NORMAL PREGNANCY IN FIRST TRIMESTER, UNSPECIFIED GRAVIDITY: ICD-10-CM

## 2024-10-31 DIAGNOSIS — M25.552 HIP PAIN, LEFT: Primary | ICD-10-CM

## 2024-10-31 DIAGNOSIS — R10.2 PERINEAL PAIN IN FEMALE: ICD-10-CM

## 2024-10-31 DIAGNOSIS — N94.89 LABIAL PAIN: ICD-10-CM

## 2024-10-31 PROCEDURE — 97140 MANUAL THERAPY 1/> REGIONS: CPT | Mod: GP | Performed by: PHYSICAL THERAPIST

## 2024-10-31 PROCEDURE — 97032 APPL MODALITY 1+ESTIM EA 15: CPT | Mod: GP | Performed by: PHYSICAL THERAPIST

## 2024-10-31 PROCEDURE — 97110 THERAPEUTIC EXERCISES: CPT | Mod: GP | Performed by: PHYSICAL THERAPIST

## 2024-11-07 ENCOUNTER — APPOINTMENT (OUTPATIENT)
Dept: BEHAVIORAL HEALTH | Facility: CLINIC | Age: 34
End: 2024-11-07
Payer: COMMERCIAL

## 2024-11-07 ENCOUNTER — TREATMENT (OUTPATIENT)
Dept: PHYSICAL THERAPY | Facility: CLINIC | Age: 34
End: 2024-11-07
Payer: COMMERCIAL

## 2024-11-07 DIAGNOSIS — Z34.91 ENCOUNTER FOR SUPERVISION OF NORMAL PREGNANCY IN FIRST TRIMESTER, UNSPECIFIED GRAVIDITY: ICD-10-CM

## 2024-11-07 DIAGNOSIS — M25.552 HIP PAIN, LEFT: Primary | ICD-10-CM

## 2024-11-07 DIAGNOSIS — N94.89 LABIAL PAIN: ICD-10-CM

## 2024-11-07 DIAGNOSIS — R10.2 PERINEAL PAIN IN FEMALE: ICD-10-CM

## 2024-11-07 PROCEDURE — 97110 THERAPEUTIC EXERCISES: CPT | Mod: GP | Performed by: PHYSICAL THERAPIST

## 2024-11-07 PROCEDURE — 97032 APPL MODALITY 1+ESTIM EA 15: CPT | Mod: GP | Performed by: PHYSICAL THERAPIST

## 2024-11-07 PROCEDURE — 97140 MANUAL THERAPY 1/> REGIONS: CPT | Mod: GP | Performed by: PHYSICAL THERAPIST

## 2024-11-07 NOTE — PROGRESS NOTES
Physical Therapy Treatment    Patient Name: Zulema Oliveira  MRN: [id number]  Today's Date: 2024  Time Calculation  Start Time: 1214  Stop Time: 1308  Time Calculation (min): 54 min       PT Therapeutic Procedures Time Entry  Manual Therapy Time Entry: 30  Therapeutic Exercise Time Entry: 15  PT Modalities Time Entry  E-Stim (Attended) Time Entry: 8                INSURANCE:  Visit number: 9/10   EMP CONSUMER SELECT 3500 DED FAMILY, OOP 5100, 90/10 COVERAGE, 30V PT/OT COMBINED, AVAILITY #[id number] Transaction Time Rai 5, 9:19 AM    CONTIGO APPROVED 10 VISITS FROM 2024 TO 2024 AUT # [id number]  Encounter for supervision of normal pregnancy in first trimester, unspecified  ([facility]) Z34.91 // Nicole confirmed 6/3/24 11:17am     Referring Provider: Tito Watson MD    CURRENT PROBLEM:  1. Hip pain, left  Follow Up In Physical Therapy      2. Perineal pain in female        3. Encounter for supervision of normal pregnancy in first trimester, unspecified   Follow Up In Physical Therapy      4. Labial pain  Follow Up In Physical Therapy        SUBJECTIVE:   General -   Pt is doing home exercises.  Great after needling  BM without pain     Perineal body pain, when on the toilet still    Pain -    Pain Location/Description: Perineal Body  Pain Best: 0/10  Pain Today: 0/10  Pain Worst: 4/10     Precautions -    None    OBJECTIVE:     Verbal Informed Consent Given for Integrative Dry Needling -   Superficial Transverse perinei (1, 50 mm) ea B  S2 Paravertebral Neuro-trigger Point (1, 60 mm) ea B  Inferior Gluteal Homeostatic Neuro-trigger Point (1, 75 mm) ea B  ENS applied between S2/Inferior Glute B points for 8 min for improved blood flow and reduced pain/spasm.     Internal Palpation Exam - Consent to Pelvic Floor Internal Exam Verbally Given, Performed using universal precautions/gloves  Ischiocavernosus: (R) mild TTP; (L) mild TTP  Bulbocavernosus: (R) moderate TTP; (L)  moderate TTP  Periurethrals: (R) no TTP; (L) no TTP  Levator Ani: (R) no TTP; (L) no TTP    Treatment -   Therapeutic Exercise (99706) -  Education on tissue and pain science   Manual Therapy (93106) -    STM  Dry Needling  Internal Pelvic Floor Assessment - Gentle TPR    ASSESSMENT:     Informed consent for dry needling obtained verbally from patient and reviewed precautions/contraindications. Dry needling performed this date to homeostatic neuro-trigger points, paravertebral neuro-trigger points, and symptomatic neuro-trigger points documented. Clean field assessed before insertion of needles using universal precautions. 50-75 mm needles inserted deep with basic/rotation needle manipulation. Electric stimulation applied for further management of soft tissue dysfunction for 8 minutes. All needles removed, area inspected for adverse symptoms with none noted. Patient educated in post- dry needling management and expected symptoms from treatment. All patient questions answered. Internal pelvic floor assessment was performed with patient consent and gentle TPR for reduced pain/tone and spasm. Pt tolerated session well and is progressing towards functional needs. Continue to progress pt within tolerance and POC.    PLAN:    Continue to progress pt within tolerance. Assess response and continue with dry needling.

## 2024-11-19 ENCOUNTER — APPOINTMENT (OUTPATIENT)
Dept: BEHAVIORAL HEALTH | Facility: CLINIC | Age: 34
End: 2024-11-19
Payer: COMMERCIAL

## 2024-12-23 ENCOUNTER — SOCIAL WORK (OUTPATIENT)
Dept: BEHAVIORAL HEALTH | Facility: CLINIC | Age: 34
End: 2024-12-23
Payer: COMMERCIAL

## 2024-12-23 DIAGNOSIS — F41.1 GAD (GENERALIZED ANXIETY DISORDER): ICD-10-CM

## 2024-12-23 PROCEDURE — 90791 PSYCH DIAGNOSTIC EVALUATION: CPT | Mod: AJ,95

## 2024-12-23 NOTE — PROGRESS NOTES
Referred to therapy by: Dr. Arrieta         Reason for Referral: Reason for Referral:   Anger Issues: Frustration/irritability towards her children     Depression: low mood  Grief Issues: Father in law  in January      Sleep Problems: Patient is up during the night feeding and caring for the baby.           PSYCHOSOCIAL HISTORIES  Living Environment: Patient lives at home with her  and their two children (4month and 2 ½ yrs)   Social support network: , mother and friends. Patient states lacking tangible support, doesn't have consistent day care   Anabaptist Spiritual orientation: None   Gender Identity and sexual orientation: Female, heterosexual   Recent losses or deaths: Denies     - number of pregnancies    Para- number of live children 2  Date of delivery 2024  Tell me about your delivery experience Patient had pelvic floor issues following her delivery.   Delivery/ complications None  Is baby breast or formula fed? Formula- patient stopped breast feeding due to sleep deprivation   What is your experience with that? Patient hasn't issues with formula feeding     History of postpartum depression w/pregnancies?   Did you receive treatment for the postpartum depression?  Y    N     How was your mood in previous pregnancies or post pregnancy? Patient struggling with anxiety after the birth of her two y/o son. Patient didn't' seek services at that time. Patient did get linked with a therapist during her second pregnancy.      CHIEF COMPLAINT  CHIEF COMPLAINT SUMMARY: Patient is a [age] y/o female with past mental health hx of ERINN. Patient is not linked with a mental health provider currently. Patient stopped seeing her therapist in November due to day care issues. Patient's therapist referred her to the [facility] group after stopping sessions. Patient's stressors include caring for her children, mother's cancer diagnosis and the death of her father in  law. Patient identifies mental health symptoms as anxiety, depressed mood, fatigue and losing her sense of self. Patient wants to work on decreasing feelings of anger/frustration and regaining her sense of self in group.  HISTORY OF PRESENT ILLNESS       Patient is a [age] y/o female with past mental health hx of ERINN. Patient is not linked with a mental health provider currently. Patient stopped seeing her therapist in November due to day care issues. Patient's therapist referred her to the [facility] group after stopping sessions. Patient lives at home with her  and their two children. Patient reports having anxiety after the birth of her oldest son. Patient didn't seek services at that time. Patient started therapy while she was pregnant with her four month old son. Patient reports having issues with anxiety after her first child. Patient states feeling depressed after this pregnancy. Patient identifies mental health symptoms as anxiety, low mood, feeling inadequate, fatigue and losing her sense of self. Patient identifies current stressors as caring her children, sleep deprivation and death of her father in law.  Patient denies HI/SI and psychosis Patient is not judged to be a danger to herself or others.  Current Providers:  None     What precipitated this most recent episode? Present stressors? Patient identifies stressors as caring for her children, mother's cancer diagnosis and the death of her father in law.      Prior mental health/substance abuse treatment: patient stopped seeing her therapist in November 2024 due to day care issues. Patient doesn't take medication for her mental health.      Acute mental health symptoms:  Suicidal Ideation: Denies   Homicidal Ideation: Denies   Psychosis: Denies      Level of functioning impairment at work/home/school now: Moderate S     Substance abuse hx: Denies   Tobacco, vaping:   Alcohol:   Illicit drugs:      Significant medical hx: None          Education Hx:   Highest level of education: Hx of learning disability/IEP: Bachelor's Degree         Work Hx:   Are you currently working? Yes   Occupation:  at [employer]   Part time:  Presently: Working     How has your illness impacted your work performance? Denies      FAMILY HISTORY:   Mother: Anxiety  Paternal grandmother: Agoraphobia, anxiety   .      Hx of Abuse/Trauma History: Denies   None:  Child abuse: (physical, sexual, emotional)  Rape:  Domestic Violence:   Robbery:  Near Fatal experience:     Describe Trauma: N/A      What barriers do you see interfering with your ability to attend therapy: Denies      Goals patient wants to work on while attending therapy 2-3: Decrease anger/irritability and to feel like herself again     Biggest strengths and healthy coping strategies Patient will leave the room when she becomes frustrated with her children. Patient will confine in other mothers     Mental Status Exam:  Appropriate      Motor activity:  Appropriate       Behavior: Cooperative       Adaptive Equipment: None   Speech: Appropriate     Clear       Mood: Euthymic       Affect: Mood Congruent   Appropriate       Thought process:  Appropriate     Logical       Thought content: Appropriate       Cognition: No impairment, Orientation: Alert & Oriented x 3  Insight:  Aware of problem       Eye contact: Average       Judgment: Judgment intact        Interview behavior: Appropriate       Hallucinations: None       Delusions: Absent          PATIENT DISCUSSION/SUMMARY  PLAN:   Patient is a [age] y/o female presenting with symptoms of anxiety and depression. Patient's mental health symptoms include depressed mood, anxiety, fatigue and losing her sense of self. Patient denies HI/SI and psychosis. Patient is not judged to be a danger to herself or others. Patient will start group on 12/27.

## 2024-12-27 ENCOUNTER — CLINICAL SUPPORT (OUTPATIENT)
Dept: BEHAVIORAL HEALTH | Facility: CLINIC | Age: 34
End: 2024-12-27
Payer: COMMERCIAL

## 2024-12-27 DIAGNOSIS — F41.1 GAD (GENERALIZED ANXIETY DISORDER): ICD-10-CM

## 2024-12-27 PROCEDURE — 90853 GROUP PSYCHOTHERAPY: CPT | Mod: AJ,95

## 2024-12-27 NOTE — GROUP NOTE
Group Topic: Coping Skills   Group Date: 2024  Start Time: 12:00 PM  End Time:  1:00 PM  Facilitators: HENRY Castaneda   Department: [facility]        Name: Zulema Oliveira YOB: 1990   MR: [id number]      This was a video IOP group on a HIPAA compliant platform. All patients were provided with informed consent.  Date: 24, Time: 12pm-1pm group, Number of Patients:3, User Name: HENRY lucero  Number of Staff: 1    Group topic(s):  Understanding PMADs and cognitive distortions  Group received psychoeducation about various  mood and anxiety disorders (PMADs)- symptoms, prevalence and etiology. Patients also learned about common cognitive distortions. Discussed ways to challenge distorted thinking. Zulema was present, attentive.     Facilitator: HENRY Kim

## 2025-01-02 ENCOUNTER — EVALUATION (OUTPATIENT)
Dept: PHYSICAL THERAPY | Facility: CLINIC | Age: 35
End: 2025-01-02
Payer: COMMERCIAL

## 2025-01-02 DIAGNOSIS — Z34.91 ENCOUNTER FOR SUPERVISION OF NORMAL PREGNANCY IN FIRST TRIMESTER, UNSPECIFIED GRAVIDITY: ICD-10-CM

## 2025-01-02 DIAGNOSIS — M25.552 HIP PAIN, LEFT: Primary | ICD-10-CM

## 2025-01-02 DIAGNOSIS — N94.89 LABIAL PAIN: ICD-10-CM

## 2025-01-02 DIAGNOSIS — R10.2 PERINEAL PAIN IN FEMALE: ICD-10-CM

## 2025-01-02 PROCEDURE — 97032 APPL MODALITY 1+ESTIM EA 15: CPT | Mod: GP | Performed by: PHYSICAL THERAPIST

## 2025-01-02 PROCEDURE — 97140 MANUAL THERAPY 1/> REGIONS: CPT | Mod: GP | Performed by: PHYSICAL THERAPIST

## 2025-01-02 PROCEDURE — 97110 THERAPEUTIC EXERCISES: CPT | Mod: GP | Performed by: PHYSICAL THERAPIST

## 2025-01-02 ASSESSMENT — PATIENT HEALTH QUESTIONNAIRE - PHQ9
1. LITTLE INTEREST OR PLEASURE IN DOING THINGS: NOT AT ALL
2. FEELING DOWN, DEPRESSED OR HOPELESS: SEVERAL DAYS
10. IF YOU CHECKED OFF ANY PROBLEMS, HOW DIFFICULT HAVE THESE PROBLEMS MADE IT FOR YOU TO DO YOUR WORK, TAKE CARE OF THINGS AT HOME, OR GET ALONG WITH OTHER PEOPLE: SOMEWHAT DIFFICULT
SUM OF ALL RESPONSES TO PHQ9 QUESTIONS 1 AND 2: 1

## 2025-01-02 NOTE — PROGRESS NOTES
Physical Therapy Evaluation and Treatment      Patient Name: Zulema Oliveira  MRN: [id number]  Today's Date: 1/2/2025  Time Calculation  Start Time: 1110  Stop Time: 1210  Time Calculation (min): 60 min         PT Therapeutic Procedures Time Entry  Manual Therapy Time Entry: 15  Therapeutic Exercise Time Entry: 30  PT Modalities Time Entry  E-Stim (Attended) Time Entry: 13                INSURANCE:  Visit number: 1/3   EMPLOYEE [employer]AN HEALTHY CHOICE    Referring Provider: Tito Watson MD     ASSESSMENT:  PT Assessment Results: decreased knowledge of HEP, activity limitations, ADLs/IADLs/self care skills, flexibility, motor function/control/tone, pain, participation restrictions, range of motion/joint mobility, strength, posture, transfers, coordination, balance, gait/locomotion, decreased knowledge of precautions.  Rehab Prognosis: Good  Evaluation/Treatment Tolerance: Patient tolerated treatment well  Stable and/or uncomplicated characteristics    Pt is progressing towards her goals and demonstrates improvements with both ROM and strength at this time. Pt reports an increased ability to perform ADLs, but still exhibits deficits as shown by her NIH-CPSI score. Pt will benefit from continued skilled PT in order to further improve ROM and strength of the pelvis/B hips and abdominals so that the pt can perform ADLs without pain and return to PLOF.    PLAN:  Treatments/Interventions: traction, gait training, dry needling, edema control, education/instruction, home program, self care/home management, manual therapy, neuromuscular re-education, therapeutic activities, therapeutic exercises, modalities, therapeutic elastic taping.   PT Plan: Skilled PT  PT Frequency: 1 time per week  Duration: 3 visits  Rehab Potential: Good  Plan of Care Agreement: Patient    Goals -    STG - After about 6 weeks:  Pt will report less than a 4/10 pain at the worst. (Goal PROGRESSING)    Pt will be able to manage changes in  "intra-abdominal pressure with appropriate pelvic floor and TA muscle activation. (Goal PROGRESSING)    Pt will be able to coordinate pelvic floor with thoracic diaphragm during functional activities that cause symptoms. (Goal PROGRESSING)    Pt will increase by 1 MMT grade for B hips. (Goal PROGRESSING)       LTG - after about 12 weeks:  Pt will report less than a 0-2/10 pain at the worst.  Pt will have at least 4+/5 to 5/5 strength for B hips.   Pt will have AROM to WFL for the lumbar spine.   Patient will demonstrate good lumbo-pelvic dissociation to increase postural awareness and alignment for toileting and strengthening.   Patient will demonstrate the ability to contract-relax-and eccentrically lengthen the pelvic floor.   Patient will report intercourse without discomfort to improve QoL.    Patient will be knowledgeable of healthy bowel/bladder habits measured by teach back.   Pt will report a significant improvement with Female NIH-CPSI score by 5 points (MDC).  Pt will be independent with progressed HEP.    CURRENT PROBLEM:   1. Hip pain, left  Follow Up In Physical Therapy      2. Perineal pain in female        3. Encounter for supervision of normal pregnancy in first trimester, unspecified   Follow Up In Physical Therapy      4. Labial pain  Follow Up In Physical Therapy          Subjective      PELVIC HISTORY:  History of Current Episode/Chief Complaint -  Pt is doing home exercises.  Felt good after last visit, but had a little relapse after not doing some of the exercises. And a large BM set her back.  Pt is achy in her perennial body still, but her rectum pain is much better.    Overview from 24 -     Delivery Date: 2024  GA at Delivery: 40 wk 3 d  Type of Delivery: Vaginal, Spontaneous   Baby's Name: [place]  Baby's Wt: 10 lb 3 oz  Pt is coming back to PFPT after having her son through vaginal delivery. Pt states that it was \"pretty rough\" in the beginning since he was a pretty large " baby. Pt did have some tearing and loss of feeling in her pelvic floor. Pt now notes that she gets a lot of pain when taking a BM especially if she is sitting there for a while. Pt also reports that she cannot tell how much she is going. Pt does not report any leaking at this time. Pt does report that her perineal body feels hard and tight. Pt reports that she does still have some stitches that have not dissolved yet.     Mechanism of Injury -    Insidious Onset     Pelvic Pain -   Pain when emptying bladder: No  Pain with wiping or tight clothing: No  Pain with intercourse: Not Yet  History of back pain: Yes    Pain Location/Description: Perennial body, back pain  Pain Best: 0/10  Pain Today: 2/10  Pain Worst: 4-5/10   Pain Type: Intermittent, Achy/Dull, sometimes Sharp, Stiffness/Tightness, Burning, Pressure, Spasm, Throbbing, Numbness, Tingling  Pain Exacerbating Factors: BM, sitting on the toilet, squatting, and lifting.  Pain Relieving Factors: Changing positions, Tylenol, Ice  Difficulty Sleeping: No  Night Sweats, Loss of Appetite, Unexpected Weight-loss: No  Saddle Anesthesia: No     Pelvic Exercise -   Do you do Kegels? Yes, Painful; educated to stop for now.   Current exercise regime: Walking     Bladder Hx -   Excessive Urinary Urgency: No  Water Consumption a day: Trying to drink more, 30 oz about  Daytime Voiding Frequency: 4-5  Nighttime Voiding Frequency: 0  Unintentional urine loss frequency: 0  Difficulty initiating flow of urine: No  Slow/weak urine stream: No  Do you push to urinate: No  Able to completely empty bladder: Sometimes no     Bowel Hx -   Excessive Bowel Urgency: Yes  BM Frequency: Usually ever day to every other  Frequent Diarrhea: No  Frequent constipation/straining/incomplete emptying: Yes, with hemorrhoids   Unintentional stool loss: No, but did in the beginning     Work -   Work Status: Full Time Nurse Educator     Home Living -    Pt lives with , 2 boys, 1 dog.     Patient  Stated Goals -   Reduce pain, feel normal    PRECAUTIONS -    None    Prior Level of Function -    I with ADLs/IADLs     Objective     Hip AROM (degrees) - WFL grossly except reduced IR and extension B     Hip MMT (/5) -  R hip Flexion: 5   R hip ER: 4+  R hip IR: 4+  R hip Abduction: 4   R hip Adduction: 5-  L hip Flexion: 5-  L hip ER: 4   L hip IR: 4+  L hip Abduction: 4   L hip Adduction: 5-     Lumbar AROM -   Lumbar Flexion: 90%  Lumbar Extension: 60%  R Lumbar Side Bendin%  L Lumbar Side Bendin%     Posture -   Increased Lumbar Lordosis/APT     Functional Assessment/Special Tests -  Trendelenburg positive bilateral  Breathing: Educated on diaphragmatic breathing  MICHELLE positive bilateral L>R  Reid positive bilateral  SLR negative bilateral     Palpation - Consent to External Palpation Verbally Given  Diastasis Recti: None  Good Alignment of ASIS    Internal Palpation Exam - Plan to perform next visit with patient consent    Outcome Measures -   Other Measures  Other Outcome Measures: Female NIH-CPSI: 17 (Pain 10, Urinary 0, QOL 7)     Verbal Informed Consent Given for Integrative Dry Needling -   L5 Homeostatic Neuro-trigger Point (1, 60 mm) ea B  S2 Paravertebral Neuro-trigger Point (1, 60 mm) ea B  Inferior Gluteal Homeostatic Neuro-trigger Point (1, 75 mm) ea B  ENS applied between S2/Inferior Glute B points for 8 min for improved blood flow and reduced pain/spasm.   Common Fibular Homeostatic Neuro-trigger Point (1, 50 mm) R  VLO Symptomatic Neuro-trigger Point (1, 60 mm) R  ENS applied between R VLO/Common Fibular points for 5 min for improved blood flow and reduced pain/spasm.     Treatment -   Re-Evaluation (11213)  Manual Therapy (67459) -    STM lumbar paraspinals, glutes, and R thigh  Dry Needling    OP Patient Education -   Access Code: [email or web address]  URL: [email or web address]  Date: 2025  Prepared by: Maggie Mccormack    Exercises  - Modified Reid Stretch  - 1-2  x daily - 3 sets - 30 seconds hold  - Supine Piriformis Stretch with Foot on Ground  - 1-2 x daily - 3 sets - 30 sec hold  - Bridge on Heels  - 1-2 x daily - 3 sets - 10 reps - 2 sec hold  - Supine Hip Internal and External Rotation  - 1-2 x daily - 10 reps - 2-3 sec hold  - Cat Cow  - 1-2 x daily - 2-3 sets - 10 reps  - Quadruped Transversus Abdominis Bracing  - 1-2 x daily - 2 sets - 10 reps - 5 sec hold  - Supine Pelvic Floor Stretch  - 1-2 x daily - 1-2 min hold  - Seated Happy Baby With Trunk Flexion For Pelvic Relaxation  - 1-2 x daily - 1-2 min hold  - Beginner Front Arm Support  - 1-2 x daily - 2 sets - 10 reps  - Standing Hip Hiking  - 1-2 x daily - 2 sets - 10 reps

## 2025-01-03 ENCOUNTER — CLINICAL SUPPORT (OUTPATIENT)
Dept: BEHAVIORAL HEALTH | Facility: CLINIC | Age: 35
End: 2025-01-03
Payer: COMMERCIAL

## 2025-01-03 DIAGNOSIS — F41.1 GAD (GENERALIZED ANXIETY DISORDER): ICD-10-CM

## 2025-01-03 PROCEDURE — 90853 GROUP PSYCHOTHERAPY: CPT | Mod: AJ,95

## 2025-01-03 NOTE — GROUP NOTE
Group Topic: Coping Skills   Group Date: 1/3/2025  Start Time: 12:00 PM  End Time:  1:00 PM  Facilitators: HENRY Castaneda   Department: [facility]        Name: Zulema Oliveira YOB: 1990   MR: [id number]      This was a video IOP group on a HIPAA compliant platform. All patients were provided with informed consent.      Date: 1/03/35 Time: 12pm-1:00pm group, Number of Patients: 3, User Name: [email or web address]  Number of Staff: 1     Group topic(s): Self-care and sleep hygiene   Spoke with patients about self-care and its importance. Reviewed various levels of self-care and ways to implement pleasurable activities into their daily lives. Encouraged patient's to identify one-two self-care practices they can implement this week. Provided psychoeducation on sleep hygiene.     Facilitator: HENRY Kim

## 2025-01-10 ENCOUNTER — APPOINTMENT (OUTPATIENT)
Dept: BEHAVIORAL HEALTH | Facility: CLINIC | Age: 35
End: 2025-01-10
Payer: COMMERCIAL

## 2025-01-16 ENCOUNTER — APPOINTMENT (OUTPATIENT)
Dept: PHYSICAL THERAPY | Facility: CLINIC | Age: 35
End: 2025-01-16
Payer: COMMERCIAL

## 2025-01-16 DIAGNOSIS — R10.2 PERINEAL PAIN IN FEMALE: ICD-10-CM

## 2025-01-16 DIAGNOSIS — M25.552 HIP PAIN, LEFT: Primary | ICD-10-CM

## 2025-01-17 ENCOUNTER — CLINICAL SUPPORT (OUTPATIENT)
Dept: BEHAVIORAL HEALTH | Facility: CLINIC | Age: 35
End: 2025-01-17
Payer: COMMERCIAL

## 2025-01-17 DIAGNOSIS — F41.1 GAD (GENERALIZED ANXIETY DISORDER): ICD-10-CM

## 2025-01-17 PROCEDURE — 90853 GROUP PSYCHOTHERAPY: CPT | Mod: AJ,95

## 2025-01-17 NOTE — GROUP NOTE
Group Topic: Coping Skills   Group Date: 1/17/2025  Start Time: 12:00 PM  End Time:  1:00 PM  Facilitators: HENRY Castaneda   Department: [facility]        Name: Zulema Oliveira YOB: 1990   MR: [id number]      This was a video IOP group on a HIPAA compliant platform. All patients were provided with informed consent.  Date: 01/17/25, Time: 12pm-1pm group, Number of Patients:3, User Name: HENRY lucero  Number of Staff: 1    Group topic(s): Self-compassion-Dr. Taylor Haley   Provided psychoeducation on self-compassion and pillars. Group discussion on what makes self-compassion difficult. Facilitated compassionate friend exercise. Patient's given the opportunity to share feedback. Played general self-compassion break and tender self-compassion brake for group.   Facilitator: HENRY Kim

## 2025-01-23 ENCOUNTER — TREATMENT (OUTPATIENT)
Dept: PHYSICAL THERAPY | Facility: CLINIC | Age: 35
End: 2025-01-23
Payer: COMMERCIAL

## 2025-01-23 DIAGNOSIS — M25.552 HIP PAIN, LEFT: Primary | ICD-10-CM

## 2025-01-23 DIAGNOSIS — R10.2 PERINEAL PAIN IN FEMALE: ICD-10-CM

## 2025-01-23 DIAGNOSIS — Z34.91 ENCOUNTER FOR SUPERVISION OF NORMAL PREGNANCY IN FIRST TRIMESTER, UNSPECIFIED GRAVIDITY: ICD-10-CM

## 2025-01-23 DIAGNOSIS — N94.89 LABIAL PAIN: ICD-10-CM

## 2025-01-23 PROCEDURE — 97110 THERAPEUTIC EXERCISES: CPT | Mod: GP | Performed by: PHYSICAL THERAPIST

## 2025-01-23 PROCEDURE — 97140 MANUAL THERAPY 1/> REGIONS: CPT | Mod: GP | Performed by: PHYSICAL THERAPIST

## 2025-01-23 PROCEDURE — 97032 APPL MODALITY 1+ESTIM EA 15: CPT | Mod: GP | Performed by: PHYSICAL THERAPIST

## 2025-01-23 NOTE — PROGRESS NOTES
Physical Therapy Treatment    Patient Name: Zulema Oliveira  MRN: [id number]  Today's Date: 2025  Time Calculation  Start Time: 1004  Stop Time: 1103  Time Calculation (min): 59 min       PT Therapeutic Procedures Time Entry  Manual Therapy Time Entry: 20  Therapeutic Exercise Time Entry: 25  PT Modalities Time Entry  E-Stim (Attended) Time Entry: 8                INSURANCE:  Visit number: 2/3  [facility] APPROVED 12V 25 - 3/31/25 AUTH#[id number] CPT CODES: 83291, 95829, 21340, 69550, 49206, 78690, 30710   BENEFIT, [facility] CHO[facility], AUTH REQ-CONTIGO, 2500 DED SINGLE, OOP 5000, 90/10 COVERAGE, 30V PT/OT COMBINED, AVAILITY #[id number] Transaction Time , 8:13 PM    Referring Provider: Tito Watson MD     CURRENT PROBLEM:  1. Hip pain, left  Follow Up In Physical Therapy      2. Perineal pain in female        3. Encounter for supervision of normal pregnancy in first trimester, unspecified   Follow Up In Physical Therapy      4. Labial pain  Follow Up In Physical Therapy        SUBJECTIVE:   General -   Pt is doing home exercises.  Once period stopped soreness gone   Buttcheek pain on R side    Pain -    Pain Location/Description: R Glute and Perineal   Pain Today: 3-4/10    Precautions -    None    OBJECTIVE:     Internal Palpation Exam - Consent to Pelvic Floor Internal Exam Verbally Given, Performed using universal precautions/gloves  Ischiocavernosus: (R) mild TTP; (L) mild TTP  Bulbocavernosus: (R) moderate TTP; (L) moderate TTP  Periurethrals: (R) mild TTP; (L) moderate TTP  Levator Ani: (R) mild TTP; (L) moderate TTP  Obturator Internus: (R) mild TTP; (L) moderate TTP    Verbal Informed Consent Given for Integrative Dry Needling -   S2 Paravertebral Neuro-trigger Point (1, 60 mm) ea B  Inferior Gluteal Homeostatic Neuro-trigger Point (1, 75 mm) ea B  ENS applied between S2/Inferior Glute B points for 8 min for improved blood flow and reduced pain/spasm.     Treatment -   Therapeutic  Exercise (60061) -  Self MET: 5 sec x5  Bridges 3-way: x10 ea  TA Quadruped alt LEs: x10 ea   Hip Hike: reviewed  Manual Therapy (85059) -    Internal Pelvic Floor Assessment - Gentle TPR  Dry Needling  STM to lumbo-pelvic region  MET    OP Patient Education -   Access Code: [email or web address]  URL: [email or web address]  Date: 01/23/2025  Prepared by: Maggie Mccormack  Added Exercises  - Hooklying Sacroiliac Joint Isometric (Mirrored)  - 1 x daily - 2 sets - 10 reps - 5 sec hold    ASSESSMENT:     Internal pelvic floor assessment was performed with patient consent and gentle TPR for reduced pain/tone and spasm. Informed consent for dry needling obtained verbally from patient and reviewed precautions/contraindications. Dry needling performed this date to homeostatic neuro-trigger points, paravertebral neuro-trigger points, and symptomatic neuro-trigger points documented. Clean field assessed before insertion of needles using universal precautions. Wiped with isopropyl alcohol in sterile fashion for unclean skin. 60-75 mm needles inserted deep with basic/rotation needle manipulation. Electric stimulation applied for further management of soft tissue dysfunction for 8 minutes. All needles removed, area inspected for adverse symptoms with none noted. Patient educated in post- dry needling management and expected symptoms from treatment. All patient questions answered. MET performed for improved pelvic alignment and pt was introduced to a self MET for home.     PLAN:    Continue to progress pt within tolerance. Plan to make one more appointment about a month out. Pt will cancel if she is doing well.

## 2025-01-24 ENCOUNTER — CLINICAL SUPPORT (OUTPATIENT)
Dept: BEHAVIORAL HEALTH | Facility: CLINIC | Age: 35
End: 2025-01-24
Payer: COMMERCIAL

## 2025-01-24 DIAGNOSIS — F41.1 GAD (GENERALIZED ANXIETY DISORDER): ICD-10-CM

## 2025-01-24 PROCEDURE — 90853 GROUP PSYCHOTHERAPY: CPT | Mod: AJ,95

## 2025-01-24 NOTE — GROUP NOTE
Group Topic: Coping Skills   Group Date: 1/24/2025  Start Time: 12:00 PM  End Time:  1:00 PM  Facilitators: HENRY Castaneda   Department: [facility]        Name: Zulema Oliveira YOB: 1990   MR: [id number]      This was a video IOP group on a HIPAA compliant platform. All patients were provided with informed consent.  Date: 01/24/25, Time: 12pm-1pm group, Number of Patients:5, User Name: HENRY lucero  Number of Staff: 1    Group topic(s): Amira ALCARAZ, Role transitions   Discussed changes patients have experienced since pregnancy and becoming parent. Spoke about grief over past life. Read strength check list and asked patients to identify their strengths during this challenging time. Patient sharing some positive things they have learned or experienced from parenthood and pregnancy.     Facilitator: HENRY Kim

## 2025-01-31 ENCOUNTER — APPOINTMENT (OUTPATIENT)
Dept: BEHAVIORAL HEALTH | Facility: CLINIC | Age: 35
End: 2025-01-31
Payer: COMMERCIAL

## 2025-02-07 ENCOUNTER — APPOINTMENT (OUTPATIENT)
Dept: BEHAVIORAL HEALTH | Facility: CLINIC | Age: 35
End: 2025-02-07
Payer: COMMERCIAL

## 2025-02-14 ENCOUNTER — APPOINTMENT (OUTPATIENT)
Dept: BEHAVIORAL HEALTH | Facility: CLINIC | Age: 35
End: 2025-02-14
Payer: COMMERCIAL

## 2025-02-20 ENCOUNTER — APPOINTMENT (OUTPATIENT)
Dept: PHYSICAL THERAPY | Facility: CLINIC | Age: 35
End: 2025-02-20
Payer: COMMERCIAL

## 2025-02-20 DIAGNOSIS — R10.2 PERINEAL PAIN IN FEMALE: ICD-10-CM

## 2025-02-20 DIAGNOSIS — M25.552 HIP PAIN, LEFT: Primary | ICD-10-CM

## 2025-07-01 ENCOUNTER — OFFICE VISIT (OUTPATIENT)
Dept: URGENT CARE | Age: 35
End: 2025-07-01
Payer: COMMERCIAL

## 2025-07-01 VITALS
HEART RATE: 104 BPM | RESPIRATION RATE: 16 BRPM | BODY MASS INDEX: 34.36 KG/M2 | WEIGHT: 240 LBS | TEMPERATURE: 98.1 F | DIASTOLIC BLOOD PRESSURE: 88 MMHG | OXYGEN SATURATION: 99 % | SYSTOLIC BLOOD PRESSURE: 132 MMHG | HEIGHT: 70 IN

## 2025-07-01 DIAGNOSIS — J02.9 VIRAL PHARYNGITIS: Primary | ICD-10-CM

## 2025-07-01 LAB
POC HUMAN RHINOVIRUS PCR: POSITIVE
POC INFLUENZA A VIRUS PCR: NEGATIVE
POC INFLUENZA B VIRUS PCR: NEGATIVE
POC RESPIRATORY SYNCYTIAL VIRUS PCR: NEGATIVE
POC STREPTOCOCCUS PYOGENES (GROUP A STREP) PCR: NEGATIVE

## 2025-07-01 ASSESSMENT — PAIN SCALES - GENERAL: PAINLEVEL_OUTOF10: 5

## 2025-07-01 NOTE — PROGRESS NOTES
"Subjective   Patient ID: Zulema Oliveira is a 34 y.o. female. They present today with a chief complaint of Illness (Swollen tonsils - Entered by patient began 2 days ago, not getting better. Fevers 2-3 days ago).    Patient disposition: Home    History of Present Illness  HPI  Swollen and sore tonsils, fevers for the past 2 days.  2 children at home had low-grade fevers last week for 1 day and then resolved.  No congestion or cough.  No ear pain or headache.  Had fever initially which resolved and now settled in her throat.  Has been taking Motrin, tea, hydrating.  No allergies.  No other complaints or symptoms.      Past Medical History  Allergies as of 07/01/2025    (No Known Allergies)       Prescriptions Prior to Admission[1]     Current Medications[2]    Problem List[3]    Surgical History[4]     reports that she has never smoked. She has never used smokeless tobacco. She reports current alcohol use. She reports that she does not use drugs.    Review of Systems  As noted in HPI. ROS otherwise negative unless noted.       Objective    Vitals:    07/01/25 1737   BP: 132/88   Pulse: 104   Resp: 16   Temp: 36.7 °C (98.1 °F)   TempSrc: Oral   SpO2: 99%   Weight: 109 kg (240 lb)   Height: 1.778 m (5' 10\")     No LMP recorded.    Physical Exam  Constitutional: vital signs reviewed. Well developed, well nourished. patient alert and patient without distress.   Head and Face: Normal and atraumatic.    Ears, Nose, Mouth, and Throat:   Hearing: Normal.  External inspection of nose: Normal.   Lips, teeth, tongue and gums: Normal and well hydrated. External inspection of ears: Normal. Ear canals and TMs: Normal.  Posterior pharynx moist, no exudate, symmetric, no abscess, and 1+ injected tonsils.  Nasal mucosa normal  Lymphatic: No cervical lymphadenopathy  Neck: No neck mass was observed. Supple. normal muscle tone.   Cardiovascular: Heart rate normal, normal S1 and S2, no gallops, no murmurs and no pericardial rub. Rhythm: " Normal.  Pulmonary: No respiratory distress. Palpation of chest: Normal. Clear bilateral breath sounds.   Psych: Normal mood and affect        Procedures    Point of Care Test & Imaging Results from this visit  Results for orders placed or performed in visit on 07/01/25   POCT SPOTFIRE R/ST Panel Mini w/Strep A ([facility]) manually resulted    Collection Time: 07/01/25  6:02 PM   Result Value Ref Range    POC Group A Strep, PCR Negative Negative    POC Respiratory Syncytial Virus PCR Negative Negative    POC Influenza A Virus PCR Negative Negative    POC Influenza B Virus PCR Negative Negative    POC Human Rhinovirus PCR Positive (A) Negative            Diagnostic study results (if any) were reviewed.  (If applicable) preliminary radiology reading: None    Assessment/Plan   Allergies, medications, history, and pertinent labs/EKGs/Imaging reviewed.        Medical Decision Making  See note    Orders and Diagnoses  Diagnoses and all orders for this visit:  Sore throat  -     POCT SPOTFIRE R/ST Panel Mini w/Strep A ([facility]) manually resulted      Medical Admin Record      Follow Up Instructions  No follow-ups on file.    At time of discharge patient was clinically well-appearing and HDS for outpatient management. The patient and/or family was educated regarding diagnosis, supportive care, OTC and Rx medications. The patient and/or family was given the opportunity to ask questions prior to discharge and all questions answered. They verbalized understanding of my discussion of the plans for treatment, expected course, indications to return to  or seek further evaluation in ED, and the need for timely follow up as directed.      [facility]           [1] (Not in a hospital admission)   [2]   Current Outpatient Medications   Medication Sig Dispense Refill    prenatal vit 75/iron/folic/om3 (ONE DAILY PRENATAL ORAL) Take by mouth. (Patient not taking: Reported on 7/1/2025)       No current  facility-administered medications for this visit.   [3]   Patient Active Problem List  Diagnosis    Palpitations    History of pre-eclampsia    Trigger point of abdomen    Shortness of breath    Generalized anxiety disorder    Slow transit constipation    Hip pain, left    Perineal pain in female    Abdominal trauma, initial encounter    V[facility] delivery [facility])   [4]   Past Surgical History:  Procedure Laterality Date    OTHER SURGICAL HISTORY  07/13/2020    No history of surgery

## 2025-07-01 NOTE — PATIENT INSTRUCTIONS
A rapid PCR test was performed today including tests for Influenza A, influenza B, RSV, rhinovirus (common cold virus), strep throat.  The results were: Positive for rhinovirus    You have an upper respiratory infection. Mostly, these are caused by viruses and treatment is as follows. Symptoms may last for up to 1-2 weeks, but follow up with PCP if your symptoms start worsening.  For muscle aches, fever, chills: Acetaminophen or Ibuprofen, Advil, or Aleve can be used.  Hydration: Maintain adequate hydration with water.  Nasal symptoms: Nasal Saline available over-the-counter, can be used 3-4 times per day  Decongestants reduce nasal congestion and discharge  Vaseline at opening of nares may reduce irritation   Cool Mist Humidifier may loosens discharge  Cough Suppressants or expectorants may be taken over-the-counter     Antibiotics are not indicated for treatment, as antibiotics do not treat viruses.

## 2025-07-13 NOTE — ASSESSMENT & PLAN NOTE
- Labs: Ordered today: CBC, T&S, RPR, HCV, HBsAg, HIV, GC/CT  - Genetic Screening: counseled on cffDNA - will think about it.  - Carrier Screening: options discussed and patient desires to proceed with SMA. S/p CF.  - Ultrasound Surveillance: Viability not yet confirmed. NT ordered. Anatomy ultrasound at 16-20 weeks.   - Substance Use: denies EtOH, Tob, other substances.   - Mood Screen: Desires OB Psych contact information.  - IPV Screen: no concerns.    - Weight Gain: BMI 31.  Total weight gain of 11-20 lbs recommended. Exercise recommendations reviewed.   - Dental Care: No current complaints. Encouraged regular dental care.  - Nutrition: Prenatal vitamin declines Rx. Encouraged consumption of low-mercury fish, avoidance of raw/undercooked fish. Encouraged avoidance of un-heated luncheon meats, hot dogs, unpasteurized cheeses and milks, whyte or meat spreads, smoked seafood, raw/undercooked meats and eggs, unwashed fruits and vegetables.   - Nausea/Vomiting: Currently symptomatic. Discussed B6/doxylamine for prevention. Encouraged patient to notify providers if symptoms become bothersome. Declines Rx   Fall

## 2025-07-14 ENCOUNTER — APPOINTMENT (OUTPATIENT)
Dept: OBSTETRICS AND GYNECOLOGY | Facility: CLINIC | Age: 35
End: 2025-07-14
Payer: COMMERCIAL

## 2025-08-11 ENCOUNTER — APPOINTMENT (OUTPATIENT)
Dept: OBSTETRICS AND GYNECOLOGY | Facility: CLINIC | Age: 35
End: 2025-08-11
Payer: COMMERCIAL

## 2025-10-06 ENCOUNTER — APPOINTMENT (OUTPATIENT)
Dept: OBSTETRICS AND GYNECOLOGY | Facility: CLINIC | Age: 35
End: 2025-10-06
Payer: COMMERCIAL